# Patient Record
Sex: FEMALE | Race: WHITE | Employment: FULL TIME | URBAN - METROPOLITAN AREA
[De-identification: names, ages, dates, MRNs, and addresses within clinical notes are randomized per-mention and may not be internally consistent; named-entity substitution may affect disease eponyms.]

---

## 2017-02-06 ENCOUNTER — HOSPITAL ENCOUNTER (OUTPATIENT)
Dept: DIABETES SERVICES | Age: 27
Discharge: HOME | End: 2017-02-06
Payer: COMMERCIAL

## 2017-02-06 DIAGNOSIS — E10.9 TYPE 1 DIABETES MELLITUS WITHOUT COMPLICATION (HCC): ICD-10-CM

## 2017-02-06 PROCEDURE — G0108 DIAB MANAGE TRN  PER INDIV: HCPCS

## 2017-04-07 ENCOUNTER — HOSPITAL ENCOUNTER (OUTPATIENT)
Dept: DIABETES SERVICES | Age: 27
Discharge: HOME | End: 2017-04-07

## 2017-04-07 DIAGNOSIS — E10.9 TYPE 1 DIABETES MELLITUS WITHOUT COMPLICATION (HCC): ICD-10-CM

## 2017-04-24 ENCOUNTER — HOSPITAL ENCOUNTER (OUTPATIENT)
Dept: DIABETES SERVICES | Age: 27
Discharge: HOME | End: 2017-04-24

## 2017-04-24 DIAGNOSIS — E10.9 TYPE 1 DIABETES MELLITUS WITHOUT COMPLICATION (HCC): ICD-10-CM

## 2017-12-11 ENCOUNTER — OFFICE VISIT (OUTPATIENT)
Dept: OBGYN CLINIC | Age: 27
End: 2017-12-11

## 2017-12-11 VITALS
WEIGHT: 153 LBS | HEART RATE: 67 BPM | BODY MASS INDEX: 25.49 KG/M2 | SYSTOLIC BLOOD PRESSURE: 103 MMHG | HEIGHT: 65 IN | DIASTOLIC BLOOD PRESSURE: 65 MMHG

## 2017-12-11 DIAGNOSIS — Z01.419 ENCOUNTER FOR GYNECOLOGICAL EXAMINATION: Primary | ICD-10-CM

## 2017-12-11 NOTE — PROGRESS NOTES
Annual exam ages 24-36    Ivette Joshi is a ,  28 y.o. female [facility] Patient's last menstrual period was 2017., who presents for her annual checkup. Quitting her job, going back to school for [employer]. Since her last annual GYN exam about one year ago on 16, she has had the following changes in her health history: None    Has been trying to get pregnant since last January. HgbA1C 6.5 at last check 3 months ago. Has endo appt this week. (Dr. Jameson Alicia)  Cycles are regular, every month, about every 27 days. Has been using adenike. Reports blood work done through endo that confirmed ovulation. ADDITIONAL CONCERNS:  She is having breast lump at aroud 10 o'clock in her right breast, noticed it a week ago, prior to cycle. Has not grown in size. Also, a small lump in left labia, noticed it 2 weeks ago, also has not grown in size. Does shave her area. Does not feel labial lump today. With regard to the Gardasil vaccine, she is older than the FDA approved age to receive it. Menstrual status:    Her periods are light, moderate in flow. She is using one to two pads or tampons per day, lasting for 5 days  without spotting. She has dysmenorrhea. She has premenstrual symptoms. Contraception:    The current method of family planning is none. Sexual history:     She  reports that she currently engages in sexual activity and has had male partners. She reports using the following method of birth control/protection: None. .        Pap and Mammogram History:    Her most recent Pap smear was normal, HPV was not indicated, obtained 1 year(s) ago on 16. She does not have a history of abnormal paps.     The patient has never had a mammogram.    Breast Cancer History/Substance Abuse: Negative     Past Medical History:   Diagnosis Date    Diabetes mellitus ([facility] [address]     Type I    Encounter for insertion of mirena IUD 2012    Encounter for IUD removal 2012    due to chronic cramping    Goiter     Pap smear for cervical cancer screening 16    Negative (no hpv)      Past Surgical History:   Procedure Laterality Date    HX LIPOSUCTION      HX ORTHOPAEDIC      foot surgery     OB History    Para Term  AB Living   0 0 0 0 0 0   SAB TAB Ectopic Molar Multiple Live Births   0 0 0  0              Current Outpatient Prescriptions   Medication Sig Dispense Refill    OTHER       INSULIN LISPRO (HUMALOG SC) 0.9 Units/hr by SubCUTAneous route daily. Continuous basal rate      SPIRONOLACTONE PO Take  by mouth.  insulin glargine (LANTUS) 100 unit/mL injection by SubCUTAneous route nightly.  insulin aspart (NOVOLOG) 100 unit/mL injection 15 Units by SubCUTAneous route as needed.  norethindrone-ethinyl estradiol (JUNEL 1/20, ,) 1-20 mg-mcg per tablet Take 1 Tab by mouth daily. Allergies: Review of patient's allergies indicates no known allergies. Social History     Social History    Marital status: SINGLE     Spouse name: N/A    Number of children: N/A    Years of education: N/A     Occupational History    Not on file. Social History Main Topics    Smoking status: Never Smoker    Smokeless tobacco: Never Used      Comment: Never used vapor or e-cigs    Alcohol use No    Drug use: No    Sexual activity: Yes     Partners: Male     Birth control/ protection: None     Other Topics Concern    Not on file     Social History Narrative     Tobacco History:  reports that she has never smoked. She has never used smokeless tobacco.  Alcohol Abuse:  reports that she does not drink alcohol. Drug Abuse:  reports that she does not use illicit drugs. There is no problem list on file for this patient.     Family History   Problem Relation Age of Onset    Diabetes Paternal Grandmother      Type I    Hypertension Maternal Grandmother     Hypertension Mother        Review of Systems - History obtained from the patient  Constitutional: negative for weight loss, fever, night sweats  HEENT: negative for hearing loss, earache, congestion, snoring, sorethroat  CV: negative for chest pain, palpitations, edema  Resp: negative for cough, shortness of breath, wheezing  GI: negative for change in bowel habits, abdominal pain, black or bloody stools  : negative for frequency, dysuria, hematuria, vaginal discharge  MSK: negative for back pain, joint pain, muscle pain  Breast: negative for nipple discharge, galactorrhea  Skin :negative for itching, rash, hives  Neuro: negative for dizziness, headache, confusion, weakness  Psych: negative for anxiety, depression, change in mood  Heme/lymph: negative for bleeding, bruising, pallor    Physical Exam    Visit Vitals    /65    Pulse 67    Ht 5' 5\" (1.651 m)    Wt 153 lb (69.4 kg)    LMP 12/06/2017    BMI 25.46 kg/m2       Constitutional  · Appearance: well-nourished, well developed, alert, in no acute distress    HENT  · Head and Face: appears normal    Neck  · Inspection/Palpation: normal appearance, no masses or tenderness  · Lymph Nodes: no lymphadenopathy present  · Thyroid: gland size mild/mod enlarged, nontender, no nodules or masses present on palpation    Chest  · Respiratory Effort: breathing unlabored  · Auscultation: normal breath sounds    Cardiovascular  · Heart:  · Auscultation: regular rate and rhythm without murmur    Breasts  · Inspection of Breasts: breasts symmetrical, no skin changes, no discharge present, nipple appearance normal, no skin retraction present  · Palpation of Breasts and Axillae: left - no masses present on palpation, no breast tenderness; right with small cystic mass @ 0900, smooth, mobile  · Axillary Lymph Nodes: no lymphadenopathy present    Gastrointestinal  · Abdominal Examination: abdomen non-tender to palpation, normal bowel sounds, no masses present  · Liver and spleen: no hepatomegaly present, spleen not palpable  · Hernias: no hernias identified    Genitourinary  · External Genitalia: normal appearance for age, no discharge present, no tenderness present, no inflammatory lesions present, no masses present, no atrophy present  · Vagina: normal vaginal vault without central or paravaginal defects, no discharge present, no inflammatory lesions present, no masses present  · Bladder: non-tender to palpation  · Urethra: appears normal  · Cervix: normal   · Uterus: normal size, shape and consistency  · Adnexa: no adnexal tenderness present, no adnexal masses present  · Perineum: perineum within normal limits, no evidence of trauma, no rashes or skin lesions present  · Anus: anus within normal limits, no hemorrhoids present  · Inguinal Lymph Nodes: no lymphadenopathy present    Skin  · General Inspection: no rash, no lesions identified    Neurologic/Psychiatric  · Mental Status:  · Orientation: grossly oriented to person, place and time  · Mood and Affect: mood normal, affect appropriate        Assessment & Plan:  · Routine gynecologic examination. Pap neg 7/2016 -> rpt q 3yrs. · Labial lump resolved  · Right breast with small cyst. Low suspicion. If does not resolve in next 1-2wks, call office for [facility]BC referral.  · Counseled re: diet, exercise, healthy lifestyle  · Return for yearly wellness visits  · Thyromegaly. Followed by endo. Nl labs/US per pt. · Type 1 DM. Sees Dr. Tyrell Fang. · Desires pregnancy. Sounds like she is ovulating normally (asked her to have labs sent from endo). Discussed fertility consult, HSG, semen analysis (packet given).   · Patient verbalized understanding

## 2017-12-11 NOTE — PATIENT INSTRUCTIONS
[facility] Help Desk: [phone]       Well Visit, Ages 25 to 48: Care Instructions  Your Care Instructions    Physical exams can help you stay healthy. Your doctor has checked your overall health and may have suggested ways to take good care of yourself. He or she also may have recommended tests. At home, you can help prevent illness with healthy eating, regular exercise, and other steps. Follow-up care is a key part of your treatment and safety. Be sure to make and go to all appointments, and call your doctor if you are having problems. It's also a good idea to know your test results and keep a list of the medicines you take. How can you care for yourself at home? · Reach and stay at a healthy weight. This will lower your risk for many problems, such as obesity, diabetes, heart disease, and high blood pressure. · Get at least 30 minutes of physical activity on most days of the week. Walking is a good choice. You also may want to do other activities, such as running, swimming, cycling, or playing tennis or team sports. Discuss any changes in your exercise program with your doctor. · Do not smoke or allow others to smoke around you. If you need help quitting, talk to your doctor about stop-smoking programs and medicines. These can increase your chances of quitting for good. · Talk to your doctor about whether you have any risk factors for sexually transmitted infections (STIs). Having one sex partner (who does not have STIs and does not have sex with anyone else) is a good way to avoid these infections. · Use birth control if you do not want to have children at this time. Talk with your doctor about the choices available and what might be best for you. · Protect your skin from too much sun. When you're outdoors from 10 a.m. to 4 p.m., stay in the shade or cover up with clothing and a hat with a wide brim. Wear sunglasses that block UV rays.  Even when it's cloudy, put broad-spectrum sunscreen (SPF 30 or higher) on any exposed skin. · See a dentist one or two times a year for checkups and to have your teeth cleaned. · Wear a seat belt in the car. · Drink alcohol in moderation, if at all. That means no more than 2 drinks a day for men and 1 drink a day for women. Follow your doctor's advice about when to have certain tests. These tests can spot problems early. For everyone  · Cholesterol. Have the fat (cholesterol) in your blood tested after age 21. Your doctor will tell you how often to have this done based on your age, family history, or other things that can increase your risk for heart disease. · Blood pressure. Have your blood pressure checked during a routine doctor visit. Your doctor will tell you how often to check your blood pressure based on your age, your blood pressure results, and other factors. · Vision. Talk with your doctor about how often to have a glaucoma test.  · Diabetes. Ask your doctor whether you should have tests for diabetes. · Colon cancer. Have a test for colon cancer at age 48. You may have one of several tests. If you are younger than 48, you may need a test earlier if you have any risk factors. Risk factors include whether you already had a precancerous polyp removed from your colon or whether your parent, brother, sister, or child has had colon cancer. For women  · Breast exam and mammogram. Talk to your doctor about when you should have a clinical breast exam and a mammogram. Medical experts differ on whether and how often women under 50 should have these tests. Your doctor can help you decide what is right for you. · Pap test and pelvic exam. Begin Pap tests at age 24. A Pap test is the best way to find cervical cancer. The test often is part of a pelvic exam. Ask how often to have this test.  · Tests for sexually transmitted infections (STIs). Ask whether you should have tests for STIs.  You may be at risk if you have sex with more than one person, especially if your partners do not wear condoms. For men  · Tests for sexually transmitted infections (STIs). Ask whether you should have tests for STIs. You may be at risk if you have sex with more than one person, especially if you do not wear a condom. · Testicular cancer exam. Ask your doctor whether you should check your testicles regularly. · Prostate exam. Talk to your doctor about whether you should have a blood test (called a PSA test) for prostate cancer. Experts differ on whether and when men should have this test. Some experts suggest it if you are older than 39 and are -American or have a father or brother who got prostate cancer when he was younger than 72. When should you call for help? Watch closely for changes in your health, and be sure to contact your doctor if you have any problems or symptoms that concern you. Where can you learn more? Go to http://anup-pauline.info/. Enter P072 in the search box to learn more about \"Well Visit, Ages 25 to 48: Care Instructions. \"  Current as of: May 12, 2017  Content Version: 11.4  © 5374-0655 Healthwise, Incorporated. Care instructions adapted under license by [employer] (which disclaims liability or warranty for this information). If you have questions about a medical condition or this instruction, always ask your healthcare professional. [address] any warranty or liability for your use of this information.

## 2018-02-27 ENCOUNTER — TELEPHONE (OUTPATIENT)
Dept: OBGYN CLINIC | Age: 28
End: 2018-02-27

## 2018-02-27 NOTE — TELEPHONE ENCOUNTER
Patient left  on [facility] Triage line stating that she has a yeast infection and requesting diflucan. Patient aware that Dr. Kamlesh Owens likes to  See her patient to confirm the yeast infection. She can also use OTC monistat 3 day or 7 day and if improvement, she can contact the office for an appt. Patient verbalized understanding.

## 2018-06-04 ENCOUNTER — TELEPHONE (OUTPATIENT)
Dept: OBGYN CLINIC | Age: 28
End: 2018-06-04

## 2018-06-04 NOTE — TELEPHONE ENCOUNTER
Patient left  on [facility] triage line stating that she is pregnant and wanted to know since she is a type 1 diabetic, should she be seen sooner. Patient advised she can be seen sooner, but no US can be performed since its too early. She was advised that she can still be seen at 7 to 8 weeks with an US. Patient scheduled for 6/28/2018 with an US.

## 2018-06-28 ENCOUNTER — OFFICE VISIT (OUTPATIENT)
Dept: OBGYN CLINIC | Age: 28
End: 2018-06-28

## 2018-06-28 DIAGNOSIS — O24.311 PRE-EXISTING DIABETES MELLITUS DURING PREGNANCY IN FIRST TRIMESTER: Primary | ICD-10-CM

## 2018-06-28 DIAGNOSIS — Z3A.01 7 WEEKS GESTATION OF PREGNANCY: ICD-10-CM

## 2018-06-28 DIAGNOSIS — N92.6 MISSED MENSES: ICD-10-CM

## 2018-06-28 DIAGNOSIS — Z32.01 PREGNANCY TEST-POSITIVE: ICD-10-CM

## 2018-06-28 LAB
ANTIBODY SCREEN, EXTERNAL: NEGATIVE
CHLAMYDIA, EXTERNAL: NEGATIVE
HBSAG, EXTERNAL: NEGATIVE
HIV, EXTERNAL: NEGATIVE
N. GONORRHEA, EXTERNAL: NEGATIVE
PLATELET CNT,   EXTERNAL: 292
RUBELLA, EXTERNAL: NORMAL
T. PALLIDUM, EXTERNAL: NEGATIVE
TYPE, ABO & RH, EXTERNAL: NORMAL
URINALYSIS, EXTERNAL: NEGATIVE

## 2018-06-28 RX ORDER — ONDANSETRON 4 MG/1
4 TABLET, ORALLY DISINTEGRATING ORAL
Qty: 30 TAB | Refills: 1 | Status: SHIPPED | OUTPATIENT
Start: 2018-06-28 | End: 2019-01-24

## 2018-06-28 NOTE — PATIENT INSTRUCTIONS
Weeks 6 to 10 of Your Pregnancy: Care Instructions  Your Care Instructions    Congratulations on your pregnancy. This is an exciting and important time for you. During the first 6 to 10 weeks of your pregnancy, your body goes through many changes. Your baby grows very fast, even though you cannot feel it yet. You may start to notice that you feel different, both in your body and your emotions. Because each woman's pregnancy is unique, there is no right way to feel. You may feel the healthiest you have ever been, or you may feel tired or sick to your stomach (\"morning sickness\"). These early weeks are a time to make healthy choices and to eat the best foods for you and your baby. This care sheet will give you some ideas. This is also a good time to think about birth defects testing. These are tests done during pregnancy to look for possible problems with the baby. First trimester tests for birth defects can be done between 8 and 17 weeks of pregnancy, depending on the test. Talk with your doctor about what kinds of tests are available. Follow-up care is a key part of your treatment and safety. Be sure to make and go to all appointments, and call your doctor if you are having problems. It's also a good idea to know your test results and keep a list of the medicines you take. How can you care for yourself at home? Eat well  · Eat at least 3 meals and 2 healthy snacks every day. Eat fresh, whole foods, including:  ¨ 7 or more servings of bread, tortillas, cereal, rice, pasta, or oatmeal.  ¨ 3 or more servings of vegetables, especially leafy green vegetables. ¨ 2 or more servings of fruits. ¨ 3 or more servings of milk, yogurt, or cheese. ¨ 2 or more servings of meat, turkey, chicken, fish, eggs, or dried beans. · Drink plenty of fluids, especially water. Avoid sodas and other sweetened drinks. · Choose foods that have important vitamins for your baby, such as calcium, iron, and folate.   ¨ Dairy products, tofu, canned fish with bones, almonds, broccoli, dark leafy greens, corn tortillas, and fortified orange juice are good sources of calcium. ¨ Beef, poultry, liver, spinach, lentils, dried beans, fortified cereals, and dried fruits are rich in iron. ¨ Dark leafy greens, broccoli, asparagus, liver, fortified cereals, orange juice, peanuts, and almonds are good sources of folate. · Avoid foods that could harm your baby. ¨ Do not eat raw or undercooked meat, chicken, or fish (such as sushi or raw oysters). ¨ Do not eat raw eggs or foods that contain raw eggs, such as Caesar dressing. ¨ Do not eat soft cheeses and unpasteurized dairy foods, such as Brie, feta, or blue cheese. ¨ Do not eat fish that contains a lot of mercury, such as shark, swordfish, tilefish, or valentín mackerel. Do not eat more than 6 ounces of tuna each week. ¨ Do not eat raw sprouts, especially alfalfa sprouts. ¨ Cut down on caffeine, such as coffee, tea, and cola. Protect yourself and your baby  · Do not touch silverio litter or cat feces. They can cause an infection that could harm your baby. · High body temperature can be harmful to your baby. So if you want to use a sauna or hot tub, be sure to talk to your doctor about how to use it safely. [place] with morning sickness  · Sip small amounts of water, juices, or shakes. Try drinking between meals, not with meals. · Eat 5 or 6 small meals a day. Try dry toast or crackers when you first get up, and eat breakfast a little later. · Avoid spicy, greasy, and fatty foods. · When you feel sick, open your windows or go for a short walk to get fresh air. · Try nausea wristbands. These help some women. · Tell your doctor if you think your prenatal vitamins make you sick. Where can you learn more? Go to http://amarjit.info/. Enter G112 in the search box to learn more about \"Weeks 6 to 10 of Your Pregnancy: Care Instructions. \"  Current as of: March 16, 2017  Content Version: 11.4  © 4674-9826 Healthwise, Incorporated. Care instructions adapted under license by [employer] (which disclaims liability or warranty for this information). If you have questions about a medical condition or this instruction, always ask your healthcare professional. [address] any warranty or liability for your use of this information.

## 2018-06-28 NOTE — MR AVS SNAPSHOT
[address] [place] [address] Road 
[phone] Patient: Flor Valenzuela MRN: [id number] PV Visit Information Date & Time Provider Department Dept. Phone Encounter #  
 2018 10:00 AM [address], [address]on [phone] [id number] Upcoming Health Maintenance Date Due Influenza Age 5 to Adult 2018 PAP AKA CERVICAL CYTOLOGY 2019 Allergies as of 2018  Review Complete On: 2017 By: Nak[address], MD  
 No Known Allergies Current Immunizations  Never Reviewed No immunizations on file. Not reviewed this visit You Were Diagnosed With   
  
 Codes Comments Pre-existing diabetes mellitus during pregnancy in first trimester    -  Primary ICD-10-CM: O24.311 ICD-9-CM: 648.03, 250.00 Missed menses     ICD-10-CM: N92.6 ICD-9-CM: 626.4 Pregnancy test-positive     ICD-10-CM: Z32.01 
ICD-9-CM: V72.42 Vitals OB Status Smoking Status Having regular periods Never Smoker Your Updated Medication List  
  
   
This list is accurate as of 18 10:12 AM.  Always use your most recent med list.  
  
  
  
  
 HUMALOG SC  
0.9 Units/hr by SubCUTAneous route daily. Continuous basal rate  
  
 insulin glargine 100 unit/mL injection Commonly known as:  LANTUS  
by SubCUTAneous route nightly. JUNEL 1/20 (21) 1-20 mg-mcg Tab Generic drug:  norethindrone-ethinyl estradiol Take 1 Tab by mouth daily. NovoLOG U-100 Insulin aspart 100 unit/mL injection Generic drug:  insulin aspart U-100  
15 Units by SubCUTAneous route as needed. OTHER  
  
 PROBIOTIC 4X 10-15 mg Tbec Generic drug:  B.infantis-B.ani-B.long-B.bifi Take  by mouth. SPIRONOLACTONE PO Take  by mouth. We Performed the Following ANTIBODY SCREEN [id number] CPT(R)] BLOOD TYPE, (ABO+RH) [52842 CPT(R)] CT/NG/T.VAGINALIS AMPLIFICATION C6093304 CPT(R)] CULTURE, URINE [id number] CPT(R)] HEMOGLOBIN A1C WITH EAG [72938 CPT(R)] HEP B SURFACE AG Q6642719 CPT(R)] HIV 1/2 AG/AB, 4TH GENERATION,W RFLX CONFIRM C1342705 CPT(R)] PLATELET COUNT [00548 CPT(R)] RUBELLA AB, IGG Q509743 CPT(R)] T PALLIDUM SCREEN W/REFLEX [[id number] Custom] URINALYSIS W/MICROSCOPIC [39110 CPT(R)] Patient Instructions Weeks 6 to 10 of Your Pregnancy: Care Instructions Your Care Instructions Congratulations on your pregnancy. This is an exciting and important time for you. During the first 6 to 10 weeks of your pregnancy, your body goes through many changes. Your baby grows very fast, even though you cannot feel it yet. You may start to notice that you feel different, both in your body and your emotions. Because each woman's pregnancy is unique, there is no right way to feel. You may feel the healthiest you have ever been, or you may feel tired or sick to your stomach (\"morning sickness\"). These early weeks are a time to make healthy choices and to eat the best foods for you and your baby. This care sheet will give you some ideas. This is also a good time to think about birth defects testing. These are tests done during pregnancy to look for possible problems with the baby. First trimester tests for birth defects can be done between 8 and 17 weeks of pregnancy, depending on the test. Talk with your doctor about what kinds of tests are available. Follow-up care is a key part of your treatment and safety. Be sure to make and go to all appointments, and call your doctor if you are having problems. It's also a good idea to know your test results and keep a list of the medicines you take. How can you care for yourself at home? Eat well · Eat at least 3 meals and 2 healthy snacks every day. Eat fresh, whole foods, including: ¨ 7 or more servings of bread, tortillas, cereal, rice, pasta, or oatmeal. 
 ¨ 3 or more servings of vegetables, especially leafy green vegetables. ¨ 2 or more servings of fruits. ¨ 3 or more servings of milk, yogurt, or cheese. ¨ 2 or more servings of meat, turkey, chicken, fish, eggs, or dried beans. · Drink plenty of fluids, especially water. Avoid sodas and other sweetened drinks. · Choose foods that have important vitamins for your baby, such as calcium, iron, and folate. ¨ Dairy products, tofu, canned fish with bones, almonds, broccoli, dark leafy greens, corn tortillas, and fortified orange juice are good sources of calcium. ¨ Beef, poultry, liver, spinach, lentils, dried beans, fortified cereals, and dried fruits are rich in iron. ¨ Dark leafy greens, broccoli, asparagus, liver, fortified cereals, orange juice, peanuts, and almonds are good sources of folate. · Avoid foods that could harm your baby. ¨ Do not eat raw or undercooked meat, chicken, or fish (such as sushi or raw oysters). ¨ Do not eat raw eggs or foods that contain raw eggs, such as Caesar dressing. ¨ Do not eat soft cheeses and unpasteurized dairy foods, such as Brie, feta, or blue cheese. ¨ Do not eat fish that contains a lot of mercury, such as shark, swordfish, tilefish, or valentín mackerel. Do not eat more than 6 ounces of tuna each week. ¨ Do not eat raw sprouts, especially alfalfa sprouts. ¨ Cut down on caffeine, such as coffee, tea, and cola. Protect yourself and your baby · Do not touch silverio litter or cat feces. They can cause an infection that could harm your baby. · High body temperature can be harmful to your baby. So if you want to use a sauna or hot tub, be sure to talk to your doctor about how to use it safely. [place] with morning sickness · Sip small amounts of water, juices, or shakes. Try drinking between meals, not with meals. · Eat 5 or 6 small meals a day. Try dry toast or crackers when you first get up, and eat breakfast a little later. · Avoid spicy, greasy, and fatty foods. · When you feel sick, open your windows or go for a short walk to get fresh air. · Try nausea wristbands. These help some women. · Tell your doctor if you think your prenatal vitamins make you sick. Where can you learn more? Go to http://anup-pauline.info/. Enter G112 in the search box to learn more about \"Weeks 6 to 10 of Your Pregnancy: Care Instructions. \" Current as of: March 16, 2017 Content Version: 11.4 © 8347-5530 [employer]. Care instructions adapted under license by [employer] (which disclaims liability or warranty for this information). If you have questions about a medical condition or this instruction, always ask your healthcare professional. [address] any warranty or liability for your use of this information. Please provide this summary of care documentation to your next provider. Your primary care clinician is listed as Silver Turner MD. If you have any questions after today's visit, please call [phone].

## 2018-06-28 NOTE — PROGRESS NOTES
Current pregnancy history:    Babita Larson is a ,  28 y.o. female [facility] Patient's last menstrual period was 2018. .  She presents for the evaluation of amenorrhea and a positive pregnancy test.    pregestional diabetes. On insulin pump. Sees Dr. Lieutenant Shaw. Hgb A1C has been 6.3-6.4. Told OK to try to get pregnant. Sugars have been hard to control since getting pregnant. Has appt next week. LMP history:  The date of her LMP is  certain. Her last menstrual period was normal and lasted for 4 to 5 days. A urine pregnancy test was positive 4 weeks ago. She was not on the pill at conception. Based on her LMP, her EDC is 2019 and her EGA is 7 weeks,5 days. Her menstrual cycles are regular and occur approximately every 28 days  and range from 3 to 5 days. The last menses lasted the usual number of days. Ultrasound data:  She had an  ultrasound done by the ultrasound tech today which revealed a viable jean pregnancy with a gestational age of 9 weeks and 1 days giving an [address] of 19. Pregnancy symptoms:    Since her LMP she has experienced  urinary frequency, breast tenderness, and nausea. She has not been vomiting over the last few weeks. Associated signs and symptoms which she denies: dysuria, discharge, vaginal bleeding. She states she has gained weight:  Approximately 5 pounds over the last few weeks. Relevant past pregnancy history:   She has the following pregnancy history: n/a     Relevant past medical history:(relevant to this pregnancy):   pregestational DM, on insulin pump     Pap/Occupational history:  Last pap smear: last year Results: Normal      Her occupation is: Student. Substance history: negative for alcohol, tobacco and street drugs. Positive for nothing. Exposure history: There is/are no indoor cat/s in the home. The patient was instructed to not change the cat litter.    She admits close contact with children on a regular basis. She has had chicken pox or the vaccine in the past.   Patient denies issues with domestic violence. Genetic Screening/Teratology Counseling: (Includes patient, baby's father, or anyone in either family with:)  3.  Patient's age >/= 28 at [facility]?-- no  .   2. Thalassemia ([facility], [place], [address], or  background): MCV<80?--no.     3.  Neural tube defect (meningomyelocele, spina bifida, anencephaly)?--no.   4.  Congenital heart defect?--no.  5.  Down syndrome?--no.   6.  Frank-Sachs ([place])?--no.   7.  Canavan's Disease?--no.   8.  Familial Dysautonomia?--no.   9.  Sickle cell disease or trait ()? --no   The patient has not been tested for sickle trait  10. Hemophilia or other blood disorders?--no. 11.  Muscular dystrophy?--no. 12.  Cystic fibrosis?--no. 13.  Ester's Chorea?--no. 14.  Mental retardation/autism (if yes was person tested for Fragile X)?--no. 15.  Other inherited genetic or chromosomal disorder?--no. 12.  Maternal metabolic disorder (DM, PKU, etc)? -- pregestational DM, on insulin pump  17. Patient or FOB with a child with a birth defect not listed above?--no.  17a. Patient or FOB with a birth defect themselves?--no. 18.  Recurrent pregnancy loss, or stillbirth?--no. 19.  Any medications since LMP other than prenatal vitamins (include vitamins, supplements, OTC meds, drugs, alcohol)?--no. 20.  Any other genetic/environmental exposure to discuss?--no. Infection History:  1. Lives with someone with TB or TB exposed?--no.   2.  Patient or partner has history of genital herpes?--no.  3.  Rash or viral illness since LMP?--no.    4.  History of STD (GC, CT, HPV, syphilis, HIV)? --no   5. Other: OTHER?       Past Medical History:   Diagnosis Date    Diabetes mellitus ([facility] [address]     Type I    Encounter for insertion of mirena IUD 06/07/2012    Encounter for IUD removal 07/19/2012    due to chronic cramping    Goiter 2009    Pap smear for cervical cancer screening 16    Negative (no hpv)      Past Surgical History:   Procedure Laterality Date    HX LIPOSUCTION  2013    HX ORTHOPAEDIC      foot surgery     Social History     Occupational History    Not on file. Social History Main Topics    Smoking status: Never Smoker    Smokeless tobacco: Never Used      Comment: Never used vapor or e-cigs    Alcohol use No    Drug use: No    Sexual activity: Yes     Partners: Male     Birth control/ protection: None     Family History   Problem Relation Age of Onset    Diabetes Paternal Grandmother      Type I    Hypertension Maternal Grandmother     Hypertension Mother      OB History    Para Term  AB Living   0 0 0 0 0 0   SAB TAB Ectopic Molar Multiple Live Births   0 0 0  0            No Known Allergies  Prior to Admission medications    Medication Sig Start Date End Date Taking? Authorizing Provider   B.infantis-B.ani-B.long-MAGEDbifi (PROBIOTIC 4X) 10-15 mg TbEC Take  by mouth. Yes Historical Provider   INSULIN LISPRO (HUMALOG SC) 0.9 Units/hr by SubCUTAneous route daily. Continuous basal rate   Yes Felisha Melissa MD   SPIRONOLACTONE PO Take  by mouth. Historical Provider   insulin glargine (LANTUS) 100 unit/mL injection by SubCUTAneous route nightly. Historical Provider   OTHER     Historical Provider   insulin aspart (NOVOLOG) 100 unit/mL injection 15 Units by SubCUTAneous route as needed. Felisha Melissa MD   norethindrone-ethinyl estradiol (JUNEL 1/20, ,) 1-20 mg-mcg per tablet Take 1 Tab by mouth daily.     Felisha Melissa MD        Review of Systems: History obtained from the patient  Constitutional: negative for weight loss, fever, night sweats  HEENT: negative for hearing loss, earache, congestion, snoring, sore throat  CV: negative for chest pain, palpitations, edema  Resp: negative for cough, shortness of breath, wheezing  Breast: negative for breast lumps, nipple discharge, galactorrhea  GI: negative for change in bowel habits, abdominal pain, black or bloody stools  : negative for dysuria, hematuria, vaginal discharge  MSK: negative for back pain, joint pain, muscle pain  Skin: negative for itching, rash, hives  Neuro: negative for dizziness, headache, confusion, weakness  Psych: negative for anxiety, depression, change in mood  Heme/lymph: negative for bleeding, bruising, pallor    Objective:  Visit Vitals    LMP 05/05/2018       Physical Exam:     Constitutional  · Appearance: well-nourished, well developed, alert, in no acute distress    HENT  · Head  · Face: appears normal  · Eyes: appear normal  · Ears: normal  · Mouth: normal  · Lips: no lesions    Neck  · Inspection/Palpation: normal appearance, no masses or tenderness  · Lymph Nodes: no lymphadenopathy present  · Thyroid: gland size normal, nontender, no nodules or masses present on palpation    Chest  · Respiratory Effort: breathing unlabored  · Auscultation: normal breath sounds    Cardiovascular  · Heart:  · Auscultation: regular rate and rhythm without murmur    Breasts  · Inspection of Breasts: breasts symmetrical, no skin changes, no discharge present, nipple appearance normal, no skin retraction present  · Palpation of Breasts and Axillae: no masses present on palpation, no breast tenderness  · Axillary Lymph Nodes: no lymphadenopathy present    Gastrointestinal  · Abdominal Examination: abdomen non-tender to palpation, normal bowel sounds, no masses present  · Liver and spleen: no hepatomegaly present, spleen not palpable  · Hernias: no hernias identified    Genitourinary  · External Genitalia: normal appearance for age, no discharge present, no tenderness present, no inflammatory lesions present, no masses present, no atrophy present  · Vagina: normal vaginal vault without central or paravaginal defects, no discharge present, no inflammatory lesions present, no masses present  · Bladder: non-tender to palpation  · Urethra: appears normal  · Cervix: normal   · Uterus: enlarged 6-8wks, normal shape, soft  · Adnexa: no adnexal tenderness present, no adnexal masses present  · Perineum: perineum within normal limits, no evidence of trauma, no rashes or skin lesions present  · Anus: anus within normal limits, no hemorrhoids present  · Inguinal Lymph Nodes: no lymphadenopathy present    Skin  · General Inspection: no rash, no lesions identified    Neurologic/Psychiatric  · Mental Status:  · Orientation: grossly oriented to person, place and time  · Mood and Affect: mood normal, affect appropriate    Assessment:   Intrauterine pregnancy:  - U=D, conf'd by 7wk . OLIVIA=2/9/19  - pregestational DM, on pump. Had recent hgb A1C drawn. Has appt next wk, will stop by office   - will collect 24hr urine/crcl  - refer to [facility], 20wk   - declines aneuploidy/carrier/AFP screening  - rec 81mg ASA after 12wks, incr folate to 2mg daily      Plan:     · Offered CF testing, CVS, Nuchal Translucency, MSAFP, amnio, and discussed NIPT  · Course of pregnancy discussed including visit schedule, routine U/S, glucola testing, etc.  · Avoid alcoholic beverages and illicit/recreational drugs use  · Take prenatal vitamins or folic acid daily. · Hospital and practice style discussed with coverage system. · Discussed nutrition, toxoplasmosis precautions, sexual activity, exercise, need for influenza vaccine, environmental and work hazards, travel advice, screen for domestic violence, need for seat belts. · Discussed seafood, unpasteurized dairy products, deli meat, artificial sweeteners, and caffeine. · Information on prenatal classes/breastfeeding given. · Patient encouraged not to smoke. · Discussed current prescription drug use. Given medication list.  · Discussed the use of over the counter medications and chemicals.   · Pt understands risk of hemorrhage during pregnancy and post delivery and would accept blood products if necessary in life-threatening emergencies    Handouts given to pt.    Orders Placed This Encounter    CULTURE, URINE    PLATELET    HEP B SURFACE AG    HIV SCREEN, [facility]. W/REFLEX CONFIRM    RUBELLA AB, IGG    T PALLIDUM SCREEN W/REFLEX    CT/NG/T.VAGINALIS AMPLIFICATION     Order Specific Question:   Specimen type     Answer:   Vaginal [516]    URINALYSIS W/MICROSCOPIC    TYPE, ABO & RH    ANTIBODY SCREEN    B.infantis-B.ani-B.long-B.bifi (PROBIOTIC 4X) 10-15 mg TbEC     Sig: Take  by mouth.  ondansetron (ZOFRAN ODT) 4 mg disintegrating tablet     Sig: Take 1 Tab by mouth every eight (8) hours as needed for Nausea.      Dispense:  30 Tab     Refill:  1

## 2018-06-29 ENCOUNTER — PATIENT MESSAGE (OUTPATIENT)
Dept: OBGYN CLINIC | Age: 28
End: 2018-06-29

## 2018-06-29 DIAGNOSIS — Z96.41 INSULIN PUMP IN PLACE: Primary | ICD-10-CM

## 2018-06-30 LAB
ABO GROUP BLD: NORMAL
APPEARANCE UR: CLEAR
BACTERIA #/AREA URNS HPF: NORMAL /[HPF]
BACTERIA UR CULT: NORMAL
BILIRUB UR QL STRIP: NEGATIVE
BLD GP AB SCN SERPL QL: NEGATIVE
C TRACH RRNA SPEC QL NAA+PROBE: NEGATIVE
CASTS URNS QL MICRO: NORMAL /LPF
COLOR UR: YELLOW
EPI CELLS #/AREA URNS HPF: NORMAL /HPF
GLUCOSE UR QL: ABNORMAL
HBV SURFACE AG SERPL QL IA: NEGATIVE
HGB UR QL STRIP: NEGATIVE
HIV 1+2 AB+HIV1 P24 AG SERPL QL IA: NON REACTIVE
KETONES UR QL STRIP: NEGATIVE
LEUKOCYTE ESTERASE UR QL STRIP: NEGATIVE
MICRO URNS: ABNORMAL
MICRO URNS: ABNORMAL
MUCOUS THREADS URNS QL MICRO: PRESENT
N GONORRHOEA RRNA SPEC QL NAA+PROBE: NEGATIVE
NITRITE UR QL STRIP: NEGATIVE
PH UR STRIP: 6.5 [PH] (ref 5–7.5)
PLATELET # BLD AUTO: 292 X10E3/UL (ref 150–379)
PROT UR QL STRIP: NEGATIVE
RBC #/AREA URNS HPF: NORMAL /HPF
RH BLD: NEGATIVE
RUBV IGG SERPL IA-ACNC: 0.99 INDEX
SP GR UR: 1.01 (ref 1–1.03)
T PALLIDUM AB SER QL IA: NEGATIVE
T VAGINALIS RRNA SPEC QL NAA+PROBE: NEGATIVE
UROBILINOGEN UR STRIP-MCNC: 0.2 MG/DL (ref 0.2–1)
WBC #/AREA URNS HPF: NORMAL /HPF

## 2018-07-02 PROBLEM — Z34.90 PREGNANCY: Status: ACTIVE | Noted: 2018-07-02

## 2018-07-03 NOTE — TELEPHONE ENCOUNTER
----- Message from Nakul [address], MD sent at 7/2/2018  4:22 PM EDT -----  Regarding: FW:12 week appt. Contact: [phone]  OK to schedule with Dr. Rey Mckeon. (just let her know will need to be on different day, or M at [facility], then [facility] appt here)      ----- Message -----     From: Jacqueline Bloch     Sent: 7/2/2018   3:48 PM       To: Nakul [address], MD  Subject: FW:12 week appt. Appt made with Dr. Michelle Sher per your request. Now patient desires Dr. Rey Mckeon. Please advise?   ----- Message -----     From: Bakari Templeton     Sent: 7/1/2018  11:50 AM       To: Jacqueline Bloch  Subject: RE:12 week appt. Melvin Goss! So I spoke with my mother in law and she is one the lactation nurses there and she said she would love for me to see dr. Rey Mckeon. Is there any way I can get in with him? If not I can keep my appointment with the other doctor. I am very flexible with my days and hours, so just give me a time and I can make it! Thank you so much and appreciate your help! Ivette  ----- Message -----  From: Jacqueline Bloch  Sent: 6/29/2018  8:35 AM EDT  To: Bakari Templeton  [facility]    Dr. Doris Corbin just informed me that you also need to be seen at [facility] for your 12 week diabetes consult. I called them and they said you will be 12 weeks on 7/28/18. Dr. Michelle Sher will be in the office on 8/2/18. I went ahead and scheduled you for this appointment with them at 9:30am. You will see Dr. Doris Corbin after. I will cancel the 7/26/18 appt. If you have any questions, please do not hessite to ask me though here or call me at [phone], Ext: [phone].     Thank Tiff mendoza R.M.A.   (Dr. Murlean Opitz Nurse)

## 2018-07-03 NOTE — TELEPHONE ENCOUNTER
Spoke with Moses from [facility] and r/s'd pt's appt for Wednesday, August 1, 2018 11:20 AM so she may have Dr. Rey Mckeon. Will send her a [place] [employer].

## 2018-07-05 NOTE — PROGRESS NOTES
CBC was not included with labs. [address] to add, it is now too lat to do so. Will add note to obtain at next FOB.

## 2018-07-11 ENCOUNTER — LAB ONLY (OUTPATIENT)
Dept: OBGYN CLINIC | Age: 28
End: 2018-07-11

## 2018-07-11 DIAGNOSIS — Z3A.09 9 WEEKS GESTATION OF PREGNANCY: Primary | ICD-10-CM

## 2018-07-11 LAB
HCT, EXTERNAL: 43.5
HGB, EXTERNAL: 14.6
PLATELET CNT,   EXTERNAL: 305

## 2018-07-12 LAB
ALBUMIN SERPL-MCNC: 4.2 G/DL (ref 3.5–5.5)
ALBUMIN/GLOB SERPL: 1.4 {RATIO} (ref 1.2–2.2)
ALP SERPL-CCNC: 65 IU/L (ref 39–117)
ALT SERPL-CCNC: 13 IU/L (ref 0–32)
AST SERPL-CCNC: 12 IU/L (ref 0–40)
BILIRUB SERPL-MCNC: 0.2 MG/DL (ref 0–1.2)
BUN SERPL-MCNC: 10 MG/DL (ref 6–20)
BUN/CREAT SERPL: 13 (ref 9–23)
CALCIUM SERPL-MCNC: 9.5 MG/DL (ref 8.7–10.2)
CHLORIDE SERPL-SCNC: 99 MMOL/L (ref 96–106)
CO2 SERPL-SCNC: 22 MMOL/L (ref 20–29)
CREAT 24H UR-MRATE: 950 MG/24 HR (ref 800–1800)
CREAT CL 24H UR+SERPL-VRATE: 86 ML/MIN (ref 88–128)
CREAT SERPL-MCNC: 0.77 MG/DL (ref 0.57–1)
CREAT UR-MCNC: 50 MG/DL
ERYTHROCYTE [DISTWIDTH] IN BLOOD BY AUTOMATED COUNT: 13.1 % (ref 12.3–15.4)
GLOBULIN SER CALC-MCNC: 2.9 G/DL (ref 1.5–4.5)
GLUCOSE SERPL-MCNC: 143 MG/DL (ref 65–99)
HCT VFR BLD AUTO: 43.5 % (ref 34–46.6)
HGB BLD-MCNC: 14.6 G/DL (ref 11.1–15.9)
MCH RBC QN AUTO: 30 PG (ref 26.6–33)
MCHC RBC AUTO-ENTMCNC: 33.6 G/DL (ref 31.5–35.7)
MCV RBC AUTO: 90 FL (ref 79–97)
PLATELET # BLD AUTO: 305 X10E3/UL (ref 150–379)
POTASSIUM SERPL-SCNC: 4.4 MMOL/L (ref 3.5–5.2)
PROT 24H UR-MRATE: 95 MG/24 HR (ref 30–150)
PROT SERPL-MCNC: 7.1 G/DL (ref 6–8.5)
PROT UR-MCNC: 5 MG/DL
RBC # BLD AUTO: 4.86 X10E6/UL (ref 3.77–5.28)
SODIUM SERPL-SCNC: 136 MMOL/L (ref 134–144)
WBC # BLD AUTO: 8.7 X10E3/UL (ref 3.4–10.8)

## 2018-08-01 ENCOUNTER — HOSPITAL ENCOUNTER (OUTPATIENT)
Dept: PERINATAL CARE | Age: 28
Discharge: HOME | End: 2018-08-01
Attending: OBSTETRICS & GYNECOLOGY
Payer: COMMERCIAL

## 2018-08-01 PROCEDURE — 76801 OB US < 14 WKS SINGLE FETUS: CPT | Performed by: OBSTETRICS & GYNECOLOGY

## 2018-08-07 ENCOUNTER — ROUTINE PRENATAL (OUTPATIENT)
Dept: OBGYN CLINIC | Age: 28
End: 2018-08-07

## 2018-08-07 VITALS
DIASTOLIC BLOOD PRESSURE: 67 MMHG | WEIGHT: 153 LBS | HEART RATE: 87 BPM | HEIGHT: 65 IN | BODY MASS INDEX: 25.49 KG/M2 | SYSTOLIC BLOOD PRESSURE: 106 MMHG

## 2018-08-07 DIAGNOSIS — O24.311 PRE-EXISTING DIABETES MELLITUS DURING PREGNANCY IN FIRST TRIMESTER: Primary | ICD-10-CM

## 2018-08-07 DIAGNOSIS — Z3A.13 13 WEEKS GESTATION OF PREGNANCY: ICD-10-CM

## 2018-08-07 NOTE — PATIENT INSTRUCTIONS

## 2018-08-07 NOTE — PROGRESS NOTES
Saw MFM and endo -- had some low readings, have resolved. Readings fluctuating. On pump. +nausea -> taking Zofran. +constipation. Hasn't started ASA yet.  RTO 3wks with AFP.      - U=D, conf'd by 7wk US.  OLIVIA=2/9/19  - pregestational DM, on pump.  Had recent hgb A1C drawn.  Has appt next wk, will stop by office   - baseline 24hr=95mg tot prot; crcl=86  - refer to MFM, 20wk US  - declines aneuploidy/carrier/AFP screening  - rec 81mg ASA after 12wks, incr folate to 2mg daily  - Rh Negative -> Rhogam @ 28wks ** ___  - Rubella equivocal -> MMR PP

## 2018-08-30 ENCOUNTER — ROUTINE PRENATAL (OUTPATIENT)
Dept: OBGYN CLINIC | Age: 28
End: 2018-08-30

## 2018-08-30 VITALS
BODY MASS INDEX: 26.16 KG/M2 | WEIGHT: 157 LBS | SYSTOLIC BLOOD PRESSURE: 118 MMHG | DIASTOLIC BLOOD PRESSURE: 73 MMHG | HEIGHT: 65 IN | HEART RATE: 89 BPM

## 2018-08-30 DIAGNOSIS — Z34.02 ENCOUNTER FOR SUPERVISION OF NORMAL FIRST PREGNANCY, SECOND TRIMESTER: Primary | ICD-10-CM

## 2018-08-30 LAB — AFPT, MATERNAL, EXTERNAL: NORMAL

## 2018-08-30 RX ORDER — INSULIN LISPRO 100 [IU]/ML
INJECTION, SOLUTION INTRAVENOUS; SUBCUTANEOUS
COMMUNITY
Start: 2018-08-29

## 2018-08-30 NOTE — PATIENT INSTRUCTIONS

## 2018-08-30 NOTE — PROGRESS NOTES
? FM.  Pregest DMWas having sgnf elevations. Just had pump readjusted 2d ago - good ctrl since then. MSAFP today. Adv to check with her eye doctor if appt needed. RTO 4wks, MFM same day.

## 2018-09-02 LAB
2ND TRIMESTER 4 SCREEN SERPL-IMP: NORMAL
2ND TRIMESTER 4 SCREEN SERPL-IMP: NORMAL
AFP ADJ MOM SERPL: 0.92
AFP SERPL-MCNC: 31.6 NG/ML
AGE AT DELIVERY: 29.5 YR
COMMENTS, 018014: NORMAL
FET TS 18 RISK FROM MAT AGE: NORMAL
FET TS 21 RISK FROM MAT AGE: 741
GA METHOD: NORMAL
GA: 16.5 WEEKS
HCG ADJ MOM SERPL: 1.25
HCG SERPL-ACNC: NORMAL MIU/ML
IDDM PATIENT QL: NO
INHIBIN A ADJ MOM SERPL: 0.87
INHIBIN A SERPL-MCNC: 141.1 PG/ML
MULTIPLE PREGNANCY: NO
NEURAL TUBE DEFECT RISK FETUS: NORMAL %
RESULTS, 017389: NORMAL
TS 18 RISK FETUS: NORMAL
TS 21 RISK FETUS: 3795
U ESTRIOL ADJ MOM SERPL: 0.9
U ESTRIOL SERPL-MCNC: 0.77 NG/ML

## 2018-09-26 ENCOUNTER — HOSPITAL ENCOUNTER (OUTPATIENT)
Dept: PERINATAL CARE | Age: 28
Discharge: HOME | End: 2018-09-26
Attending: OBSTETRICS & GYNECOLOGY
Payer: COMMERCIAL

## 2018-09-26 PROCEDURE — 76811 OB US DETAILED SNGL FETUS: CPT | Performed by: OBSTETRICS & GYNECOLOGY

## 2018-10-09 ENCOUNTER — ROUTINE PRENATAL (OUTPATIENT)
Dept: OBGYN CLINIC | Age: 28
End: 2018-10-09

## 2018-10-09 VITALS
HEART RATE: 96 BPM | BODY MASS INDEX: 27.16 KG/M2 | SYSTOLIC BLOOD PRESSURE: 120 MMHG | HEIGHT: 65 IN | WEIGHT: 163 LBS | DIASTOLIC BLOOD PRESSURE: 83 MMHG

## 2018-10-09 DIAGNOSIS — O24.012 PRE-EXISTING TYPE 1 DIABETES MELLITUS DURING PREGNANCY IN SECOND TRIMESTER: Primary | ICD-10-CM

## 2018-10-09 DIAGNOSIS — Z3A.22 22 WEEKS GESTATION OF PREGNANCY: ICD-10-CM

## 2018-10-09 DIAGNOSIS — R31.9 HEMATURIA, UNSPECIFIED TYPE: ICD-10-CM

## 2018-10-09 LAB — URINALYSIS, EXTERNAL: NEGATIVE

## 2018-10-09 NOTE — PROGRESS NOTES
Right flank pain that moved to back last night -> resolved. UA/C&S. Has endo appt today, sugars fluctuating, thinks may be d/t different tubing. BERTA 10/24. Would like to move YULI to 11/13, OK since she will be seeing RANDALL on 10/24.

## 2018-10-09 NOTE — PATIENT INSTRUCTIONS
Counting Your Baby's Kicks: Care Instructions  Your Care Instructions    Counting your baby's kicks is one way your doctor can tell that your baby is healthy. Most women--especially in a first pregnancy--feel their baby move for the first time between 16 and 22 weeks. The movement may feel like flutters rather than kicks. Your baby may move more at certain times of the day. When you are active, you may notice less kicking than when you are resting. At your prenatal visits, your doctor will ask whether the baby is active. In your last trimester, your doctor may ask you to count the number of times you feel your baby move. Follow-up care is a key part of your treatment and safety. Be sure to make and go to all appointments, and call your doctor if you are having problems. It's also a good idea to know your test results and keep a list of the medicines you take. How do you count fetal kicks? · A common method of checking your baby's movement is to count the number of kicks or moves you feel in 1 hour. Ten movements (such as kicks, flutters, or rolls) in 1 hour are normal. Some doctors suggest that you count in the morning until you get to 10 movements. Then you can quit for that day and start again the next day. · Pick your baby's most active time of day to count. This may be any time from morning to evening. · If you do not feel 10 movements in an hour, your baby may be sleeping. Wait for the next hour and count again. When should you call for help? Call your doctor now or seek immediate medical care if:    · You noticed that your baby has stopped moving or is moving much less than normal.    Watch closely for changes in your health, and be sure to contact your doctor if you have any problems. Where can you learn more? Go to http://anup-pauline.info/. Enter G805 in the search box to learn more about \"Counting Your Baby's Kicks: Care Instructions. \"  Current as of: November 21, 2017  Content Version: 11.8  © 6289-1534 [employer]. Care instructions adapted under license by [employer] (which disclaims liability or warranty for this information). If you have questions about a medical condition or this instruction, always ask your healthcare professional. [address] any warranty or liability for your use of this information.

## 2018-10-10 LAB
APPEARANCE UR: CLEAR
BACTERIA #/AREA URNS HPF: ABNORMAL /[HPF]
BILIRUB UR QL STRIP: NEGATIVE
CASTS URNS QL MICRO: ABNORMAL /LPF
COLOR UR: YELLOW
EPI CELLS #/AREA URNS HPF: >10 /HPF
GLUCOSE UR QL: ABNORMAL
HGB UR QL STRIP: ABNORMAL
KETONES UR QL STRIP: NEGATIVE
LEUKOCYTE ESTERASE UR QL STRIP: ABNORMAL
MICRO URNS: ABNORMAL
MUCOUS THREADS URNS QL MICRO: PRESENT
NITRITE UR QL STRIP: NEGATIVE
PH UR STRIP: 6.5 [PH] (ref 5–7.5)
PROT UR QL STRIP: ABNORMAL
RBC #/AREA URNS HPF: ABNORMAL /HPF
SP GR UR: 1.03 (ref 1–1.03)
UROBILINOGEN UR STRIP-MCNC: 0.2 MG/DL (ref 0.2–1)
WBC #/AREA URNS HPF: ABNORMAL /HPF
YEAST #/AREA URNS HPF: PRESENT /[HPF]

## 2018-10-11 LAB — BACTERIA UR CULT: NORMAL

## 2018-10-24 ENCOUNTER — HOSPITAL ENCOUNTER (OUTPATIENT)
Dept: PERINATAL CARE | Age: 28
Discharge: HOME | End: 2018-10-24
Attending: OBSTETRICS & GYNECOLOGY
Payer: COMMERCIAL

## 2018-10-24 PROCEDURE — 76816 OB US FOLLOW-UP PER FETUS: CPT | Performed by: OBSTETRICS & GYNECOLOGY

## 2018-10-24 NOTE — PROGRESS NOTES
M - Please see the Ultrasound report / consult note to be entered into this patient's record as a scanned document from my office. A text copy of this note is also provided below for convenience. Charmayne Righter, M.D., Ph.D. 
Anirudh Santamaria 
 
--------------------------- Indication: Pre-existing DM Type 1 2nd  Tri  O24.012.  
____________________________________________________________________________ History: Age: 33 years. : 1 Para: 0.  
Current Pregnancy: Blood group: O Rhesus D-negative. Pre- pregnancy data: Weight 153 lbs. Height 5 ft 5 ins. BMI 25.5. 
____________________________________________________________________________ Dating: LMP: 18 EDC: 19 GA by LMP: [id number] Current Scan on: 10/24/18 EDC: 19 GA by current scan: 25w3d Best Overall Assessment: 10/24/18 EDC: 19 Assessed GA: [id number] The calculation of the gestational age by current scan was based on BPD, HC, AC and FL. The Best Overall Assessment is based on the LMP. 
____________________________________________________________________________ General Evaluation: 
Fetal heart activity: present. Fetal heart rate: 173 bpm.  
Presentation: BREECH. Fetal movement: visible. Amniotic Fluid: normal. Maximal vertical pocket 6.6 cm. Cord: 3 vessels. Placenta: anterior. ____________________________________________________________________________ Anatomy Scan: 
Walker Rough gestation. Biometry: 
Fetal Measurements used for the estimation of the gestational age are bolded. BPD 64.0 mm 85th% 25w6d (25w1d to 26w4d) .4 mm 41st% 24w6d (22w6d to 26w6d) .5 mm 62nd% 25w2d (24w4d to 26w0d) FL 46.5 mm 66th% 25w3d (23w3d to 27w4d) OFD 80.0 mm 54th% 24w5d HUM 40.1 mm 37th% 24w0d VENTRp 5.6 mm n/a 
EFW (lbs/oz) 1 lbs 12 ozs EFW (g) 795 g  60th% Fetal Anatomy: 
Visualized with normal appearance: chest, gastrointestinal tract, kidneys, bladder. Heart: The 4-chamber view was obtained but outflow tracts could not be adequately visualized. Summary of Ultrasound Findings: 
Transabdominal US. U/S machine: [employer] E8 Expert. U/S view: limited by fetal position. Impression: The evaluated fetal anatomy appears normal. 
 
____________________________________________________________________________ Fetal Wellbeing Assessment: 
Amniotic fluid: normal. MVP: 6.6 cm.  
 
____________________________________________________________________________ Report Summary: 
Impression: A follow-up study was performed for fetal growth. Ms. Julia Bright has Type 1 Class C diabetes managed with an insulin pump by Dr. Meg Stafford. First trimester HgbA1c was 6.5%. She reports recent challenges with glycemic control and continues to make insulin pump setting changes. We reviewed target glycemia ranges:  fasting < 90-95 mg/dL, 1-hr post-prandial < 130 mg/dL, 2-hr post-prandial < 120 mg/dL   Fetal anatomy survey was normal at 20 weeks. She is low risk for fetal Trisomy 21/18 and ONTD based on second-trimester serum screening. She reports good fetal activity and has no complaints. Single viable IUP with composite biometry consistent with dates is observed. EFW is appropriate for gestational age. Fetal anatomy was not reviewed in detail, but appears normal as indicated above. Normal fetal movements and amniotic fluid measurements are noted. Recommendations: 1. Recommend serial fetal growth ultrasound every 4 weeks. 2.   surveillance starting at 32 weeks until delivery. 3.  Recommend delivery planning for 39 weeks gestational age. 
____________________________________________________________________________ Fetal Growth Overview: 
Date GA BPD [mm] HC [mm] AC [mm] FL [mm] HUM [mm] EFW GP 
18 12 + 4 ... ... ... ... ... ... . .. 
18 20 + 4 50.2 74th 182.7 45th 162.6 69th 35.0 59th 34.0 77th 403g , 0 lbs 14 ozs n/a% 10/24/18 24 + 4 64.0 85th 228.4 41st 206.5 62nd 46.5 66th 40.1 37th 795g , 1 lbs 12 ozs 60th%%

## 2018-11-13 ENCOUNTER — ROUTINE PRENATAL (OUTPATIENT)
Dept: OBGYN CLINIC | Age: 28
End: 2018-11-13

## 2018-11-13 VITALS
RESPIRATION RATE: 16 BRPM | WEIGHT: 169.8 LBS | BODY MASS INDEX: 28.29 KG/M2 | SYSTOLIC BLOOD PRESSURE: 125 MMHG | HEIGHT: 65 IN | DIASTOLIC BLOOD PRESSURE: 87 MMHG

## 2018-11-13 DIAGNOSIS — Z23 ENCOUNTER FOR IMMUNIZATION: ICD-10-CM

## 2018-11-13 DIAGNOSIS — Z67.91 RH NEGATIVE STATE IN ANTEPARTUM PERIOD: Primary | ICD-10-CM

## 2018-11-13 DIAGNOSIS — M54.9 BACK PAIN AFFECTING PREGNANCY, ANTEPARTUM: ICD-10-CM

## 2018-11-13 DIAGNOSIS — O99.891 BACK PAIN AFFECTING PREGNANCY, ANTEPARTUM: ICD-10-CM

## 2018-11-13 DIAGNOSIS — O26.899 RH NEGATIVE STATE IN ANTEPARTUM PERIOD: Primary | ICD-10-CM

## 2018-11-13 DIAGNOSIS — Z3A.27 27 WEEKS GESTATION OF PREGNANCY: ICD-10-CM

## 2018-11-13 DIAGNOSIS — O24.012 PRE-EXISTING TYPE 1 DIABETES MELLITUS DURING PREGNANCY IN SECOND TRIMESTER: ICD-10-CM

## 2018-11-13 LAB
ANTIBODY SCREEN, EXTERNAL: NEGATIVE
HCT, EXTERNAL: 39.8
HGB, EXTERNAL: 13.7
PLATELET CNT,   EXTERNAL: 269

## 2018-11-13 NOTE — PROGRESS NOTES
33year old  27w3d pregnant patient given the 0.5ml flu injection as per MD order. Patient signed consent. Patient tolerated injection in Right deltoid with out complications. Patient given 1500 units Rhogam as per MD order. Patient had blood work drawn prior to the injection. Patient provided with the pamphlet for the Rhogam injection. Patient tolerated the injection in her left gluteus without complications.

## 2018-11-13 NOTE — PROGRESS NOTES
+FM. +back pain (chronic, prior to preg) -> PT. CBC/AbScrn. Rhogam today, HgbA1C per Dr. Jake Brewster. MFM nxt wk. RTO 2-3wks.

## 2018-11-14 LAB
BLD GP AB SCN SERPL QL: NEGATIVE
ERYTHROCYTE [DISTWIDTH] IN BLOOD BY AUTOMATED COUNT: 13 % (ref 12.3–15.4)
HBA1C MFR BLD: 5.6 % (ref 4.8–5.6)
HCT VFR BLD AUTO: 39.8 % (ref 34–46.6)
HGB BLD-MCNC: 13.7 G/DL (ref 11.1–15.9)
MCH RBC QN AUTO: 30.7 PG (ref 26.6–33)
MCHC RBC AUTO-ENTMCNC: 34.4 G/DL (ref 31.5–35.7)
MCV RBC AUTO: 89 FL (ref 79–97)
PLATELET # BLD AUTO: 269 X10E3/UL (ref 150–379)
RBC # BLD AUTO: 4.46 X10E6/UL (ref 3.77–5.28)
WBC # BLD AUTO: 12.1 X10E3/UL (ref 3.4–10.8)

## 2018-11-28 ENCOUNTER — ROUTINE PRENATAL (OUTPATIENT)
Dept: OBGYN CLINIC | Age: 28
End: 2018-11-28

## 2018-11-28 ENCOUNTER — HOSPITAL ENCOUNTER (OUTPATIENT)
Dept: PERINATAL CARE | Age: 28
Discharge: HOME | End: 2018-11-28
Attending: OBSTETRICS & GYNECOLOGY
Payer: COMMERCIAL

## 2018-11-28 VITALS
HEIGHT: 65 IN | BODY MASS INDEX: 28.96 KG/M2 | WEIGHT: 173.8 LBS | SYSTOLIC BLOOD PRESSURE: 118 MMHG | DIASTOLIC BLOOD PRESSURE: 72 MMHG

## 2018-11-28 DIAGNOSIS — Z3A.29 29 WEEKS GESTATION OF PREGNANCY: ICD-10-CM

## 2018-11-28 DIAGNOSIS — O24.012 PRE-EXISTING TYPE 1 DIABETES MELLITUS DURING PREGNANCY IN SECOND TRIMESTER: Primary | ICD-10-CM

## 2018-11-28 DIAGNOSIS — R10.9 RIGHT FLANK PAIN: ICD-10-CM

## 2018-11-28 LAB — URINALYSIS, EXTERNAL: NEGATIVE

## 2018-11-28 PROCEDURE — 76816 OB US FOLLOW-UP PER FETUS: CPT | Performed by: OBSTETRICS & GYNECOLOGY

## 2018-11-28 NOTE — PROGRESS NOTES
Saw MFM today, nl per pt. Kitty mid right back/flank pain -> UCx. RTO , MFM same day. - U=D, conf'd by 7wk US.  OLIVIA=19  - pregestational DM, on pump (to use in labor per endo).  Dr. Thomas Cummings  - baseline 24hr=95mg tot prot; crcl=86  - QS low risk  - rec 81mg ASA after 12wks, incr folate to 2mg daily  - Rh Negative -> Rhogam @ 28wks ** ___  - Rubella equivocal -> MMR PP  - MFM:  Recommendations: 1. Recommend daily low-dose aspirin (81 mg qday) from now until delivery. 2. Continue diabetes management with Dr. Thomas Cummings. Recommended target glycemia ranges: fasting < 90-95  mg/dL, 1-hr post-prandial < 130 mg/dL, 2-hr post-prandial < 120 mg/dL. 3. Recommend a detailed fetal anatomy scan at 19-20 weeks. ** ___  4. Recommend serial fetal growth ultrasound every 4 weeks from 24-28 weeks gestational age. 5.  surveillance starting at 32 weeks until delivery. 6. Recommend delivery planning for 39 weeks gestational age. - MFM US (18) 21+0 @ 20+4. Ant placenta. Nl morph. XX  - MFM US (10/24/18) 25+3 @ 24+4. 795gm (60%).

## 2018-11-30 LAB — BACTERIA UR CULT: NORMAL

## 2018-12-17 ENCOUNTER — HOSPITAL ENCOUNTER (OUTPATIENT)
Dept: PERINATAL CARE | Age: 28
Discharge: HOME | End: 2018-12-17
Attending: OBSTETRICS & GYNECOLOGY
Payer: COMMERCIAL

## 2018-12-17 ENCOUNTER — ROUTINE PRENATAL (OUTPATIENT)
Dept: OBGYN CLINIC | Age: 28
End: 2018-12-17

## 2018-12-17 VITALS
HEART RATE: 95 BPM | WEIGHT: 176.8 LBS | HEIGHT: 65 IN | BODY MASS INDEX: 29.46 KG/M2 | SYSTOLIC BLOOD PRESSURE: 133 MMHG | DIASTOLIC BLOOD PRESSURE: 89 MMHG

## 2018-12-17 DIAGNOSIS — Z23 ENCOUNTER FOR IMMUNIZATION: ICD-10-CM

## 2018-12-17 DIAGNOSIS — O24.013 PRE-EXISTING TYPE 1 DIABETES MELLITUS DURING PREGNANCY IN THIRD TRIMESTER: Primary | ICD-10-CM

## 2018-12-17 DIAGNOSIS — Z3A.32 32 WEEKS GESTATION OF PREGNANCY: ICD-10-CM

## 2018-12-17 PROCEDURE — 76818 FETAL BIOPHYS PROFILE W/NST: CPT | Performed by: OBSTETRICS & GYNECOLOGY

## 2018-12-17 NOTE — PROGRESS NOTES
Patient given TDAP vaccine per MD order. Patient signed consent. TDAP vaccine LOT [id number] EXP 4/3/2021 given in L deltoid per patient request. Patient tolerated injection well, without difficulties. This nurse offered patient time to wait in exam room after injection, patient declines. No signs or symptoms of reactions noted or reported. Patient verbalized understanding to notify office of any reactions immediately.

## 2018-12-17 NOTE — PROGRESS NOTES
No c/o. Sugars a little high -> insulin adjusted per endo. OLIVIA=2/9, tent IOL 2/4. MFM today, cont wkly BPPs. TDAP today. RTO 2wks.

## 2018-12-26 ENCOUNTER — HOSPITAL ENCOUNTER (OUTPATIENT)
Dept: PERINATAL CARE | Age: 28
Discharge: HOME | End: 2018-12-26
Attending: OBSTETRICS & GYNECOLOGY
Payer: COMMERCIAL

## 2018-12-26 PROCEDURE — 76816 OB US FOLLOW-UP PER FETUS: CPT | Performed by: OBSTETRICS & GYNECOLOGY

## 2018-12-26 PROCEDURE — 76818 FETAL BIOPHYS PROFILE W/NST: CPT | Performed by: OBSTETRICS & GYNECOLOGY

## 2019-01-03 ENCOUNTER — ROUTINE PRENATAL (OUTPATIENT)
Dept: OBGYN CLINIC | Age: 29
End: 2019-01-03

## 2019-01-03 ENCOUNTER — HOSPITAL ENCOUNTER (OUTPATIENT)
Dept: PERINATAL CARE | Age: 29
Discharge: HOME | End: 2019-01-03
Attending: OBSTETRICS & GYNECOLOGY
Payer: COMMERCIAL

## 2019-01-03 VITALS
HEIGHT: 65 IN | SYSTOLIC BLOOD PRESSURE: 134 MMHG | WEIGHT: 185 LBS | BODY MASS INDEX: 30.82 KG/M2 | HEART RATE: 86 BPM | DIASTOLIC BLOOD PRESSURE: 92 MMHG

## 2019-01-03 DIAGNOSIS — O16.9 ELEVATED BLOOD PRESSURE AFFECTING PREGNANCY, ANTEPARTUM: ICD-10-CM

## 2019-01-03 DIAGNOSIS — O24.013 PRE-EXISTING TYPE 1 DIABETES MELLITUS DURING PREGNANCY IN THIRD TRIMESTER: Primary | ICD-10-CM

## 2019-01-03 DIAGNOSIS — Z3A.34 34 WEEKS GESTATION OF PREGNANCY: ICD-10-CM

## 2019-01-03 PROCEDURE — 76818 FETAL BIOPHYS PROFILE W/NST: CPT | Performed by: OBSTETRICS & GYNECOLOGY

## 2019-01-03 NOTE — PROGRESS NOTES
Rpt NJ=466/95. +FM. +swelling. No HA, visual changes, RUQ pain. PE: tr edema, DTRs 1+. PIH labs/24hr urine. MFM appt today. RTO 1wk.        - U=D, conf'd by 7wk US.  OLIVIA=19  - pregestational DM, on pump (to use in labor per endo).  Dr. Desmond Godinez  - baseline 24hr=95mg tot prot; crcl=86  - QS low risk  - rec 81mg ASA after 12wks, incr folate to 2mg daily  - Rh Negative -> Rhogam @ 28wks ** ___  - Rubella equivocal -> MMR PP  - MFM:  Recommendations: 1. Recommend daily low-dose aspirin (81 mg qday) from now until delivery. 2. Continue diabetes management with Dr. Desmond Godinez. Recommended target glycemia ranges: fasting < 90-95  mg/dL, 1-hr post-prandial < 130 mg/dL, 2-hr post-prandial < 120 mg/dL. 3. Recommend a detailed fetal anatomy scan at 19-20 weeks. ** ___  4. Recommend serial fetal growth ultrasound every 4 weeks from 24-28 weeks gestational age. 5.  surveillance starting at 32 weeks until delivery. 6. Recommend delivery planning for 39 weeks gestational age. - MFM US (18) 21+0 @ 20+4. Ant placenta. Nl morph. XX  - MFM US (10/24/18) 25+3 @ 24+4. 795gm (60%). - MFM US (18) 30+6 @ 29+4. 1534 (61%). ANG=16.5  - MFM US (18) 34+2 @ 33+4. 2413gm (58%). AC=82%). ANG=13.9. BPP=10/10  - Induction (19) alcazar (). Pt notified.

## 2019-01-03 NOTE — PROGRESS NOTES
Patient reports sinus congestion that started yesterday  Swelling in hands and feet/ankles for 1 week

## 2019-01-04 LAB
ALBUMIN SERPL-MCNC: 3.5 G/DL (ref 3.5–5.5)
ALBUMIN/GLOB SERPL: 1.2 {RATIO} (ref 1.2–2.2)
ALP SERPL-CCNC: 136 IU/L (ref 39–117)
ALT SERPL-CCNC: 22 IU/L (ref 0–32)
AST SERPL-CCNC: 28 IU/L (ref 0–40)
BILIRUB SERPL-MCNC: <0.2 MG/DL (ref 0–1.2)
BUN SERPL-MCNC: 7 MG/DL (ref 6–20)
BUN/CREAT SERPL: 9 (ref 9–23)
CALCIUM SERPL-MCNC: 10 MG/DL (ref 8.7–10.2)
CHLORIDE SERPL-SCNC: 103 MMOL/L (ref 96–106)
CO2 SERPL-SCNC: 20 MMOL/L (ref 20–29)
CREAT SERPL-MCNC: 0.79 MG/DL (ref 0.57–1)
ERYTHROCYTE [DISTWIDTH] IN BLOOD BY AUTOMATED COUNT: 13.6 % (ref 12.3–15.4)
GLOBULIN SER CALC-MCNC: 2.9 G/DL (ref 1.5–4.5)
GLUCOSE SERPL-MCNC: 59 MG/DL (ref 65–99)
HCT VFR BLD AUTO: 38.2 % (ref 34–46.6)
HGB BLD-MCNC: 12.9 G/DL (ref 11.1–15.9)
LDH SERPL-CCNC: 211 IU/L (ref 119–226)
MCH RBC QN AUTO: 30.1 PG (ref 26.6–33)
MCHC RBC AUTO-ENTMCNC: 33.8 G/DL (ref 31.5–35.7)
MCV RBC AUTO: 89 FL (ref 79–97)
PLATELET # BLD AUTO: 233 X10E3/UL (ref 150–379)
POTASSIUM SERPL-SCNC: 4.2 MMOL/L (ref 3.5–5.2)
PROT SERPL-MCNC: 6.4 G/DL (ref 6–8.5)
RBC # BLD AUTO: 4.29 X10E6/UL (ref 3.77–5.28)
SODIUM SERPL-SCNC: 137 MMOL/L (ref 134–144)
URATE SERPL-MCNC: 4.9 MG/DL (ref 2.5–7.1)
WBC # BLD AUTO: 13.2 X10E3/UL (ref 3.4–10.8)

## 2019-01-07 ENCOUNTER — TELEPHONE (OUTPATIENT)
Dept: OBGYN CLINIC | Age: 29
End: 2019-01-07

## 2019-01-07 NOTE — TELEPHONE ENCOUNTER
Call received at 131PM    33year old  35w2d pregnant patient last seen in the office on 1/3/19. Patient denies vaginal bleeding, ROM and reports some maryse peters contractions and positive fetal movement. Patient reports some cramping at night when she lays down. Patient advised to increase her po fluids. Patient calling to ask if she can start the 24 hour urine tomorrow and keep on ice till her 1/10/19 appointment. Patient advised per Lab personnel that as long as she completed the collection on 19 and she keeps the urine in the refrig or on ice till her appointment. Patient state she got a BP cuff and last night her reading was 121/80. Patient denies headache or blurred vision. Patient states she is trying to rest and to keep her legs elevated. Patient verbalized understanding.     [place]

## 2019-01-10 ENCOUNTER — HOSPITAL ENCOUNTER (OUTPATIENT)
Dept: PERINATAL CARE | Age: 29
Discharge: HOME | End: 2019-01-10
Payer: COMMERCIAL

## 2019-01-10 ENCOUNTER — ROUTINE PRENATAL (OUTPATIENT)
Dept: OBGYN CLINIC | Age: 29
End: 2019-01-10

## 2019-01-10 VITALS — DIASTOLIC BLOOD PRESSURE: 86 MMHG | BODY MASS INDEX: 29.99 KG/M2 | SYSTOLIC BLOOD PRESSURE: 138 MMHG | WEIGHT: 180.2 LBS

## 2019-01-10 DIAGNOSIS — Z3A.35 35 WEEKS GESTATION OF PREGNANCY: ICD-10-CM

## 2019-01-10 DIAGNOSIS — O24.013 PRE-EXISTING TYPE 1 DIABETES MELLITUS DURING PREGNANCY IN THIRD TRIMESTER: ICD-10-CM

## 2019-01-10 DIAGNOSIS — O16.3 HYPERTENSION AFFECTING PREGNANCY IN THIRD TRIMESTER: Primary | ICD-10-CM

## 2019-01-10 LAB — GRBS, EXTERNAL: NEGATIVE

## 2019-01-10 PROCEDURE — 76818 FETAL BIOPHYS PROFILE W/NST: CPT | Performed by: OBSTETRICS & GYNECOLOGY

## 2019-01-10 NOTE — PROGRESS NOTES
+FM. Saw MFM today, nl per pt, report pending. Turning in 24hr urine. BPs at home upper 120s/70s-80s. Sugars more stable with new pump. GBS done. RTO 1wk.    - U=D, conf'd by 7wk US.  OLIVIA=19  - pregestational DM, on pump (to use in labor per endo).  Dr. Lieutenant Shaw  - baseline 24hr=95mg tot prot; crcl=86  - QS low risk  - rec 81mg ASA after 12wks, incr folate to 2mg daily  - Rh Negative -> Rhogam @ 28wks ** ___  - Rubella equivocal -> MMR PP  - MFM:  Recommendations: 1. Recommend daily low-dose aspirin (81 mg qday) from now until delivery. 2. Continue diabetes management with Dr. Lieutenant Shaw. Recommended target glycemia ranges: fasting < 90-95  mg/dL, 1-hr post-prandial < 130 mg/dL, 2-hr post-prandial < 120 mg/dL. 3. Recommend a detailed fetal anatomy scan at 19-20 weeks. ** ___  4. Recommend serial fetal growth ultrasound every 4 weeks from 24-28 weeks gestational age. 5.  surveillance starting at 32 weeks until delivery. 6. Recommend delivery planning for 39 weeks gestational age. - MFM US (18) 21+0 @ 20+4. Ant placenta. Nl morph. XX  - MFM US (10/24/18) 25+3 @ 24+4. 795gm (60%). - MFM US (18) 30+6 @ 29+4. 1534 (61%). ANG=16.5  - MFM US (18) 34+2 @ 33+4. 2413gm (58%). AC=82%). ANG=13.9. BPP=10/10  - Induction (18) alcazar (). Pt notified.

## 2019-01-12 LAB
GP B STREP DNA SPEC QL NAA+PROBE: NEGATIVE
PROT 24H UR-MRATE: 354 MG/24 HR (ref 30–150)
PROT UR-MCNC: 16.1 MG/DL

## 2019-01-15 ENCOUNTER — ROUTINE PRENATAL (OUTPATIENT)
Dept: OBGYN CLINIC | Age: 29
End: 2019-01-15

## 2019-01-15 ENCOUNTER — HOSPITAL ENCOUNTER (OUTPATIENT)
Dept: PERINATAL CARE | Age: 29
Discharge: HOME | End: 2019-01-15
Attending: OBSTETRICS & GYNECOLOGY
Payer: COMMERCIAL

## 2019-01-15 ENCOUNTER — TELEPHONE (OUTPATIENT)
Dept: OBGYN CLINIC | Age: 29
End: 2019-01-15

## 2019-01-15 VITALS
RESPIRATION RATE: 16 BRPM | DIASTOLIC BLOOD PRESSURE: 97 MMHG | HEART RATE: 102 BPM | SYSTOLIC BLOOD PRESSURE: 129 MMHG | WEIGHT: 182.4 LBS | HEIGHT: 65 IN | BODY MASS INDEX: 30.39 KG/M2

## 2019-01-15 DIAGNOSIS — O16.3 HYPERTENSION AFFECTING PREGNANCY IN THIRD TRIMESTER: ICD-10-CM

## 2019-01-15 DIAGNOSIS — Z3A.36 36 WEEKS GESTATION OF PREGNANCY: ICD-10-CM

## 2019-01-15 DIAGNOSIS — O24.013 PRE-EXISTING TYPE 1 DIABETES MELLITUS DURING PREGNANCY IN THIRD TRIMESTER: Primary | ICD-10-CM

## 2019-01-15 PROCEDURE — 76818 FETAL BIOPHYS PROFILE W/NST: CPT | Performed by: OBSTETRICS & GYNECOLOGY

## 2019-01-15 NOTE — PROGRESS NOTES
A little more swelling, +carpal tunnel. No HA, visual changes, RUQ/RICKEY pain. PE: tr edema. Saw endo this AM, reports initial DBP ~97 (rpt in 80s). At home usually 120s/80s. Had MFM US today, report pending. 24hr urine with 354mg tot prot. I will d/w MFM to see about recommendations for timing of delivery.

## 2019-01-18 ENCOUNTER — ROUTINE PRENATAL (OUTPATIENT)
Dept: OBGYN CLINIC | Age: 29
End: 2019-01-18

## 2019-01-18 VITALS
HEIGHT: 65 IN | BODY MASS INDEX: 30.49 KG/M2 | WEIGHT: 183 LBS | SYSTOLIC BLOOD PRESSURE: 141 MMHG | HEART RATE: 120 BPM | DIASTOLIC BLOOD PRESSURE: 88 MMHG

## 2019-01-18 DIAGNOSIS — O14.93 PRE-ECLAMPSIA IN THIRD TRIMESTER: Primary | ICD-10-CM

## 2019-01-18 NOTE — PROGRESS NOTES
+FM.  Swelling worse. cvx lg/cl, softening a little. IOL 1/22 for pre-e, FB 1/21 with Dr. Lakisha Wall. Rpt [facility] labs today.

## 2019-01-18 NOTE — PATIENT INSTRUCTIONS
Weeks 34 to 36 of Your Pregnancy: Care Instructions  Your Care Instructions    By now, your baby and your belly have grown quite large. It is almost time to give birth. A full-term pregnancy can deliver between 37 and 42 weeks. Your baby's lungs are almost ready to breathe air. The bones in your baby's head are now firm enough to protect it, but soft enough to move down through the birth canal.  You may feel excited, happy, anxious, or scared. You may wonder how you will know if you are in labor or what to expect during labor. Try to be flexible in your expectations of the birth. Because each birth is different, there is no way to know exactly what childbirth will be like for you. This care sheet will help you know what to expect and how to prepare. This may make your childbirth easier. If you haven't already had the Tdap shot during this pregnancy, talk to your doctor about getting it. It will help protect your  against pertussis infection. In the 36th week, most women have a test for group B streptococcus (GBS). GBS is a common bacteria that can live in the vagina and rectum. It can make your baby sick after birth. If you test positive, you will get antibiotics during labor. The medicine will keep your baby from getting the bacteria. Follow-up care is a key part of your treatment and safety. Be sure to make and go to all appointments, and call your doctor if you are having problems. It's also a good idea to know your test results and keep a list of the medicines you take. How can you care for yourself at home? Learn about pain relief choices  · Pain is different for every woman. Talk with your doctor about your feelings about pain. · You can choose from several types of pain relief. These include medicine or breathing techniques, as well as comfort measures. You can use more than one option. · If you choose to have pain medicine during labor, talk to your doctor about your options.  Some medicines lower anxiety and help with some of the pain. Others make your lower body numb so that you won't feel pain. · Be sure to tell your doctor about your pain medicine choice before you start labor or very early in your labor. You may be able to change your mind as labor progresses. · Rarely, a woman is put to sleep by medicine given through a mask or an IV. Labor and delivery  · The first stage of labor has three parts: early, active, and transition. ? Most women have early labor at home. You can stay busy or rest, eat light snacks, drink clear fluids, and start counting contractions. ? When talking during a contraction gets hard, you may be moving to active labor. During active labor, you should head for the hospital if you are not there already. ? You are in active labor when contractions come every 3 to 4 minutes and last about 60 seconds. Your cervix is opening more rapidly. ? If your water breaks, contractions will come faster and stronger. ? During transition, your cervix is stretching, and contractions are coming more rapidly. ? You may want to push, but your cervix might not be ready. Your doctor will tell you when to push. · The second stage starts when your cervix is completely opened and you are ready to push. ? Contractions are very strong to push the baby down the birth canal.  ? You will feel the urge to push. You may feel like you need to have a bowel movement. ? You may be coached to push with contractions. These contractions will be very strong, but you will not have them as often. You can get a little rest between contractions. ? You may be emotional and irritable. You may not be aware of what is going on around you.  ? One last push, and your baby is born. · The third stage is when a few more contractions push out the placenta. This may take 30 minutes or less. · The fourth stage is the welcome recovery. You may feel overwhelmed with emotions and exhausted but alert.  This is a good time to start breastfeeding. Where can you learn more? Go to http://anup-pauline.info/. Enter Q636 in the search box to learn more about \"Weeks 34 to 36 of Your Pregnancy: Care Instructions. \"  Current as of: September 5, 2018  Content Version: 11.9  © 8478-2450 [employer]. Care instructions adapted under license by [employer] (which disclaims liability or warranty for this information). If you have questions about a medical condition or this instruction, always ask your healthcare professional. [address] any warranty or liability for your use of this information.

## 2019-01-19 LAB
ALBUMIN SERPL-MCNC: 3.5 G/DL (ref 3.5–5.5)
ALBUMIN/GLOB SERPL: 1.5 {RATIO} (ref 1.2–2.2)
ALP SERPL-CCNC: 163 IU/L (ref 39–117)
ALT SERPL-CCNC: 18 IU/L (ref 0–32)
AST SERPL-CCNC: 21 IU/L (ref 0–40)
BILIRUB SERPL-MCNC: 0.2 MG/DL (ref 0–1.2)
BUN SERPL-MCNC: 8 MG/DL (ref 6–20)
BUN/CREAT SERPL: 10 (ref 9–23)
CALCIUM SERPL-MCNC: 8.7 MG/DL (ref 8.7–10.2)
CHLORIDE SERPL-SCNC: 102 MMOL/L (ref 96–106)
CO2 SERPL-SCNC: 18 MMOL/L (ref 20–29)
CREAT SERPL-MCNC: 0.82 MG/DL (ref 0.57–1)
ERYTHROCYTE [DISTWIDTH] IN BLOOD BY AUTOMATED COUNT: 13.7 % (ref 12.3–15.4)
GLOBULIN SER CALC-MCNC: 2.3 G/DL (ref 1.5–4.5)
GLUCOSE SERPL-MCNC: 186 MG/DL (ref 65–99)
HCT VFR BLD AUTO: 35.7 % (ref 34–46.6)
HGB BLD-MCNC: 11.9 G/DL (ref 11.1–15.9)
LDH SERPL-CCNC: 200 IU/L (ref 119–226)
MCH RBC QN AUTO: 30.1 PG (ref 26.6–33)
MCHC RBC AUTO-ENTMCNC: 33.3 G/DL (ref 31.5–35.7)
MCV RBC AUTO: 90 FL (ref 79–97)
PLATELET # BLD AUTO: 190 X10E3/UL (ref 150–379)
POTASSIUM SERPL-SCNC: 4.3 MMOL/L (ref 3.5–5.2)
PROT SERPL-MCNC: 5.8 G/DL (ref 6–8.5)
RBC # BLD AUTO: 3.96 X10E6/UL (ref 3.77–5.28)
SODIUM SERPL-SCNC: 135 MMOL/L (ref 134–144)
URATE SERPL-MCNC: 5.8 MG/DL (ref 2.5–7.1)
WBC # BLD AUTO: 10 X10E3/UL (ref 3.4–10.8)

## 2019-01-21 ENCOUNTER — HOSPITAL ENCOUNTER (INPATIENT)
Age: 29
LOS: 3 days | Discharge: HOME | End: 2019-01-24
Attending: OBSTETRICS & GYNECOLOGY | Admitting: OBSTETRICS & GYNECOLOGY
Payer: COMMERCIAL

## 2019-01-21 ENCOUNTER — ROUTINE PRENATAL (OUTPATIENT)
Dept: OBGYN CLINIC | Age: 29
End: 2019-01-21

## 2019-01-21 VITALS
HEIGHT: 65 IN | SYSTOLIC BLOOD PRESSURE: 145 MMHG | BODY MASS INDEX: 30.66 KG/M2 | RESPIRATION RATE: 19 BRPM | WEIGHT: 184 LBS | DIASTOLIC BLOOD PRESSURE: 79 MMHG | HEART RATE: 110 BPM

## 2019-01-21 DIAGNOSIS — Z3A.37 37 WEEKS GESTATION OF PREGNANCY: ICD-10-CM

## 2019-01-21 DIAGNOSIS — O14.93 PRE-ECLAMPSIA IN THIRD TRIMESTER: Primary | ICD-10-CM

## 2019-01-21 DIAGNOSIS — R52 POSTPARTUM PAIN: Primary | ICD-10-CM

## 2019-01-21 PROBLEM — O14.90 PREECLAMPSIA: Status: ACTIVE | Noted: 2019-01-21

## 2019-01-21 PROBLEM — Z34.90 PREGNANT AND NOT YET DELIVERED: Status: ACTIVE | Noted: 2019-01-21

## 2019-01-21 LAB
ALBUMIN SERPL-MCNC: 2.5 G/DL (ref 3.5–5)
ALBUMIN/GLOB SERPL: 0.6 {RATIO} (ref 1.1–2.2)
ALP SERPL-CCNC: 209 U/L (ref 45–117)
ALT SERPL-CCNC: 23 U/L (ref 12–78)
ANION GAP SERPL CALC-SCNC: 13 MMOL/L (ref 5–15)
AST SERPL-CCNC: 24 U/L (ref 15–37)
BASOPHILS # BLD: 0 K/UL (ref 0–0.1)
BASOPHILS NFR BLD: 0 % (ref 0–1)
BILIRUB SERPL-MCNC: 0.3 MG/DL (ref 0.2–1)
BUN SERPL-MCNC: 12 MG/DL (ref 6–20)
BUN/CREAT SERPL: 13 (ref 12–20)
CALCIUM SERPL-MCNC: 8.9 MG/DL (ref 8.5–10.1)
CHLORIDE SERPL-SCNC: 103 MMOL/L (ref 97–108)
CO2 SERPL-SCNC: 22 MMOL/L (ref 21–32)
CREAT SERPL-MCNC: 0.9 MG/DL (ref 0.55–1.02)
CREAT UR-MCNC: 226.09 MG/DL
DIFFERENTIAL METHOD BLD: ABNORMAL
EOSINOPHIL # BLD: 0.1 K/UL (ref 0–0.4)
EOSINOPHIL NFR BLD: 1 % (ref 0–7)
ERYTHROCYTE [DISTWIDTH] IN BLOOD BY AUTOMATED COUNT: 12.8 % (ref 11.5–14.5)
GLOBULIN SER CALC-MCNC: 3.9 G/DL (ref 2–4)
GLUCOSE SERPL-MCNC: 148 MG/DL (ref 65–100)
HCT VFR BLD AUTO: 35.1 % (ref 35–47)
HGB BLD-MCNC: 12.1 G/DL (ref 11.5–16)
IMM GRANULOCYTES # BLD AUTO: 0.1 K/UL (ref 0–0.04)
IMM GRANULOCYTES NFR BLD AUTO: 1 % (ref 0–0.5)
LYMPHOCYTES # BLD: 2.3 K/UL (ref 0.8–3.5)
LYMPHOCYTES NFR BLD: 22 % (ref 12–49)
MCH RBC QN AUTO: 30.6 PG (ref 26–34)
MCHC RBC AUTO-ENTMCNC: 34.5 G/DL (ref 30–36.5)
MCV RBC AUTO: 88.9 FL (ref 80–99)
MONOCYTES # BLD: 0.6 K/UL (ref 0–1)
MONOCYTES NFR BLD: 6 % (ref 5–13)
NEUTS SEG # BLD: 7.4 K/UL (ref 1.8–8)
NEUTS SEG NFR BLD: 71 % (ref 32–75)
NRBC # BLD: 0 K/UL (ref 0–0.01)
NRBC BLD-RTO: 0 PER 100 WBC
PLATELET # BLD AUTO: 217 K/UL (ref 150–400)
PMV BLD AUTO: 12.6 FL (ref 8.9–12.9)
POTASSIUM SERPL-SCNC: 4.4 MMOL/L (ref 3.5–5.1)
PROT SERPL-MCNC: 6.4 G/DL (ref 6.4–8.2)
PROT UR-MCNC: 136 MG/DL (ref 0–11.9)
PROT/CREAT UR-RTO: 0.6
RBC # BLD AUTO: 3.95 M/UL (ref 3.8–5.2)
SODIUM SERPL-SCNC: 138 MMOL/L (ref 136–145)
WBC # BLD AUTO: 10.5 K/UL (ref 3.6–11)

## 2019-01-21 PROCEDURE — 3E033VJ INTRODUCTION OF OTHER HORMONE INTO PERIPHERAL VEIN, PERCUTANEOUS APPROACH: ICD-10-PCS | Performed by: OBSTETRICS & GYNECOLOGY

## 2019-01-21 PROCEDURE — 59200 INSERT CERVICAL DILATOR: CPT | Performed by: OBSTETRICS & GYNECOLOGY

## 2019-01-21 PROCEDURE — 10907ZC DRAINAGE OF AMNIOTIC FLUID, THERAPEUTIC FROM PRODUCTS OF CONCEPTION, VIA NATURAL OR ARTIFICIAL OPENING: ICD-10-PCS | Performed by: OBSTETRICS & GYNECOLOGY

## 2019-01-21 PROCEDURE — 4A0HXCZ MEASUREMENT OF PRODUCTS OF CONCEPTION, CARDIAC RATE, EXTERNAL APPROACH: ICD-10-PCS | Performed by: OBSTETRICS & GYNECOLOGY

## 2019-01-21 PROCEDURE — 75410000002 HC LABOR FEE PER 1 HR: Performed by: OBSTETRICS & GYNECOLOGY

## 2019-01-21 PROCEDURE — 36415 COLL VENOUS BLD VENIPUNCTURE: CPT

## 2019-01-21 PROCEDURE — 80053 COMPREHEN METABOLIC PANEL: CPT

## 2019-01-21 PROCEDURE — 65270000029 HC RM PRIVATE

## 2019-01-21 PROCEDURE — 74011250637 HC RX REV CODE- 250/637: Performed by: OBSTETRICS & GYNECOLOGY

## 2019-01-21 PROCEDURE — 76060000078 HC EPIDURAL ANESTHESIA: Performed by: NURSE ANESTHETIST, CERTIFIED REGISTERED

## 2019-01-21 PROCEDURE — 75410000000 HC DELIVERY VAGINAL/SINGLE: Performed by: OBSTETRICS & GYNECOLOGY

## 2019-01-21 PROCEDURE — 85025 COMPLETE CBC W/AUTO DIFF WBC: CPT

## 2019-01-21 PROCEDURE — 75410000003 HC RECOV DEL/VAG/CSECN EA 0.5 HR: Performed by: OBSTETRICS & GYNECOLOGY

## 2019-01-21 PROCEDURE — 84156 ASSAY OF PROTEIN URINE: CPT

## 2019-01-21 PROCEDURE — 77010026065 HC OXYGEN MINIMUM MEDICAL AIR: Performed by: OBSTETRICS & GYNECOLOGY

## 2019-01-21 RX ORDER — LIDOCAINE HYDROCHLORIDE 10 MG/ML
20 INJECTION INFILTRATION; PERINEURAL ONCE
Status: ACTIVE | OUTPATIENT
Start: 2019-01-21 | End: 2019-01-22

## 2019-01-21 RX ORDER — MAGNESIUM SULFATE 100 %
4 CRYSTALS MISCELLANEOUS AS NEEDED
Status: DISCONTINUED | OUTPATIENT
Start: 2019-01-21 | End: 2019-01-24 | Stop reason: HOSPADM

## 2019-01-21 RX ORDER — BISMUTH SUBSALICYLATE 262 MG
1 TABLET,CHEWABLE ORAL DAILY
COMMUNITY

## 2019-01-21 RX ORDER — ONDANSETRON 2 MG/ML
4 INJECTION INTRAMUSCULAR; INTRAVENOUS
Status: DISCONTINUED | OUTPATIENT
Start: 2019-01-21 | End: 2019-01-23 | Stop reason: HOSPADM

## 2019-01-21 RX ORDER — MINERAL OIL
120 OIL (ML) ORAL ONCE
Status: ACTIVE | OUTPATIENT
Start: 2019-01-21 | End: 2019-01-22

## 2019-01-21 RX ORDER — MAG HYDROX/ALUMINUM HYD/SIMETH 200-200-20
30 SUSPENSION, ORAL (FINAL DOSE FORM) ORAL
Status: DISCONTINUED | OUTPATIENT
Start: 2019-01-21 | End: 2019-01-23 | Stop reason: HOSPADM

## 2019-01-21 RX ORDER — NALOXONE HYDROCHLORIDE 0.4 MG/ML
0.4 INJECTION, SOLUTION INTRAMUSCULAR; INTRAVENOUS; SUBCUTANEOUS AS NEEDED
Status: DISCONTINUED | OUTPATIENT
Start: 2019-01-21 | End: 2019-01-23 | Stop reason: HOSPADM

## 2019-01-21 RX ORDER — SODIUM CHLORIDE, SODIUM LACTATE, POTASSIUM CHLORIDE, CALCIUM CHLORIDE 600; 310; 30; 20 MG/100ML; MG/100ML; MG/100ML; MG/100ML
125 INJECTION, SOLUTION INTRAVENOUS CONTINUOUS
Status: DISCONTINUED | OUTPATIENT
Start: 2019-01-21 | End: 2019-01-21

## 2019-01-21 RX ORDER — DEXTROSE MONOHYDRATE 25 G/50ML
12.5-25 INJECTION, SOLUTION INTRAVENOUS AS NEEDED
Status: DISCONTINUED | OUTPATIENT
Start: 2019-01-21 | End: 2019-01-24 | Stop reason: HOSPADM

## 2019-01-21 RX ORDER — SODIUM CHLORIDE 0.9 % (FLUSH) 0.9 %
5-40 SYRINGE (ML) INJECTION EVERY 8 HOURS
Status: DISCONTINUED | OUTPATIENT
Start: 2019-01-21 | End: 2019-01-23

## 2019-01-21 RX ORDER — ASPIRIN 81 MG/1
81 TABLET ORAL DAILY
COMMUNITY
End: 2019-01-24

## 2019-01-21 RX ORDER — SODIUM CHLORIDE, SODIUM LACTATE, POTASSIUM CHLORIDE, CALCIUM CHLORIDE 600; 310; 30; 20 MG/100ML; MG/100ML; MG/100ML; MG/100ML
125 INJECTION, SOLUTION INTRAVENOUS CONTINUOUS
Status: DISCONTINUED | OUTPATIENT
Start: 2019-01-22 | End: 2019-01-24 | Stop reason: HOSPADM

## 2019-01-21 RX ORDER — ZOLPIDEM TARTRATE 5 MG/1
5 TABLET ORAL
Status: DISCONTINUED | OUTPATIENT
Start: 2019-01-21 | End: 2019-01-24 | Stop reason: HOSPADM

## 2019-01-21 RX ORDER — SODIUM CHLORIDE 0.9 % (FLUSH) 0.9 %
5-40 SYRINGE (ML) INJECTION AS NEEDED
Status: DISCONTINUED | OUTPATIENT
Start: 2019-01-21 | End: 2019-01-23

## 2019-01-21 RX ORDER — ACETAMINOPHEN 325 MG/1
650 TABLET ORAL
Status: DISCONTINUED | OUTPATIENT
Start: 2019-01-21 | End: 2019-01-24 | Stop reason: HOSPADM

## 2019-01-21 RX ORDER — OXYTOCIN/0.9 % SODIUM CHLORIDE 30/500 ML
0-25 PLASTIC BAG, INJECTION (ML) INTRAVENOUS
Status: DISCONTINUED | OUTPATIENT
Start: 2019-01-22 | End: 2019-01-24 | Stop reason: HOSPADM

## 2019-01-21 RX ADMIN — ACETAMINOPHEN 650 MG: 325 TABLET ORAL at 22:00

## 2019-01-21 RX ADMIN — ZOLPIDEM TARTRATE 5 MG: 5 TABLET ORAL at 22:00

## 2019-01-21 NOTE — H&P
History & Physical 
 
Name: Crystal Lyle MRN: [id number]  SSN: [id number] YOB: 1990  Age: 33 y.o. Sex: female Subjective:  
 
Estimated Date of Delivery: 19 OB History  Para Term  AB Living 1 0 0 0 0 0 SAB TAB Ectopic Molar Multiple Live Births  
 0 0 0   0 MsZeenat Shaw is admitted with pregnancy at 42w2d for induction of labor. Prenatal course was complicated by diabetes - Type 1 and mild PIH. Please see prenatal records for details. Past Medical History:  
Diagnosis Date  Diabetes mellitus ([facility] [address] Type I  
 Encounter for insertion of mirena IUD 2012  Encounter for IUD removal 2012  
 due to chronic cramping  Goiter 2009  Pap smear for cervical cancer screening 16 Negative (no hpv) Past Surgical History:  
Procedure Laterality Date  HX LIPOSUCTION  2013  HX ORTHOPAEDIC    
 foot surgery  
 
@ Social History Socioeconomic History  Marital status:  Spouse name: Not on file  Number of children: Not on file  Years of education: Not on file  Highest education level: Not on file Social Needs  Financial resource strain: Not on file  Food insecurity - worry: Not on file  Food insecurity - inability: Not on file  Transportation needs - medical: Not on file  Transportation needs - non-medical: Not on file Occupational History  Not on file Tobacco Use  Smoking status: Never Smoker  Smokeless tobacco: Never Used  Tobacco comment: Never used vapor or e-cigs Substance and Sexual Activity  Alcohol use: No  
 Drug use: No  
 Sexual activity: Yes  
  Partners: Male Birth control/protection: None Other Topics Concern  Not on file Social History Narrative  Not on file Family History Problem Relation Age of Onset  Diabetes Paternal Grandmother Type I  
 Hypertension Maternal Grandmother  Hypertension Mother No Known Allergies Prior to Admission medications Medication Sig Start Date End Date Taking? Authorizing Provider HUMALOG U-100 INSULIN 100 unit/mL injection  8/29/18   Provider, Historical  
PNV38-Iron Cbn&Gluc-FA-DSS-DHA 35-1- mg cmpk Take  by mouth. Provider, Historical  
B.infantis-B.ani-B.long-B.bifi (PROBIOTIC 4X) 10-15 mg TbEC Take  by mouth. Provider, Historical  
ondansetron (ZOFRAN ODT) 4 mg disintegrating tablet Take 1 Tab by mouth every eight (8) hours as needed for Nausea. 6/28/18   Trace Vasquez MD  
insulin glargine (LANTUS) 100 unit/mL injection by SubCUTAneous route nightly. Provider, Historical  
OTHER     Provider, Historical  
insulin aspart (NOVOLOG) 100 unit/mL injection 15 Units by SubCUTAneous route as needed. Felisha Melissa MD  
INSULIN LISPRO (HUMALOG SC) 0.9 Units/hr by SubCUTAneous route daily. Continuous basal rate    Other, MD Felisha  
  
 
Review of Systems: A comprehensive review of systems was negative except for that written in the HPI. Objective:  
 
Vitals: There were no vitals filed for this visit. Physical Exam: 
Chest: clear Heart: RRR Abdomen: Gravid Cervix: 30/0.5/-3/vtx Membranes:  Intact Fetal Heart Rate: Reactive Prenatal Labs:  
Lab Results Component Value Date/Time  
 Rubella, External Equivocal 06/28/2018 GrBStrep, External Negative  01/10/2019 HBsAg, External Negative  06/28/2018 HIV, External Negative  06/28/2018 Gonorrhea, External Negative  06/28/2018 Chlamydia, External Negative  06/28/2018 Assessment/Plan:  
 
Plan: Admit for induction of labor due to mild PIH and pre- gestational diabetes. Pt will manage her own insulin tonight with her pump. Sabineye Afshin cervical dilator balloon placed in the office. Will check [address] [facility] labs on admit. See prenatal labs. Signed By:  Juan Roberts MD   
 January 21, 2019

## 2019-01-21 NOTE — PROGRESS NOTES
yeppt Cervical Dilator insertion procedure note    Kallie Moeller is a ,  33 y.o. female who presents today far placement of a alcazar catheter in the cervix for ripening. Her cervix is unfavorable and she is scheduled for induction tomorrow. She has elected to have a cervical alcazar catheter placement today. The risks, benefits and assets of the procedure were discussed. Her questions were answered. PROCEDURE: The vagina and cervix was prepped with Zephiran. A yeppt cervical catheter was placed through the cervix without difficulty. The distal uterine  bulb was inflated with 60 cc of saline and then the vaginal bulb was inflated to 60 cc. . Bleeding was minimal. The patient's level of discomfort was minimal. The catheter was taped to the patients thigh. POST PROCEDURE: The patient tolerated the procedure well. There were no complications. She was  admitted as an outpatient for monitoring overnight. She will manage her own BS with her pump tonight.

## 2019-01-22 ENCOUNTER — ANESTHESIA (OUTPATIENT)
Dept: LABOR AND DELIVERY | Age: 29
End: 2019-01-22
Payer: COMMERCIAL

## 2019-01-22 ENCOUNTER — ANESTHESIA EVENT (OUTPATIENT)
Dept: LABOR AND DELIVERY | Age: 29
End: 2019-01-22
Payer: COMMERCIAL

## 2019-01-22 PROCEDURE — 74011250636 HC RX REV CODE- 250/636: Performed by: OBSTETRICS & GYNECOLOGY

## 2019-01-22 PROCEDURE — 74011000250 HC RX REV CODE- 250

## 2019-01-22 PROCEDURE — 77030014125 HC TY EPDRL BBMI -B: Performed by: ANESTHESIOLOGY

## 2019-01-22 PROCEDURE — 00HU33Z INSERTION OF INFUSION DEVICE INTO SPINAL CANAL, PERCUTANEOUS APPROACH: ICD-10-PCS | Performed by: ANESTHESIOLOGY

## 2019-01-22 PROCEDURE — 3E0R3BZ INTRODUCTION OF ANESTHETIC AGENT INTO SPINAL CANAL, PERCUTANEOUS APPROACH: ICD-10-PCS | Performed by: ANESTHESIOLOGY

## 2019-01-22 PROCEDURE — 75410000002 HC LABOR FEE PER 1 HR: Performed by: OBSTETRICS & GYNECOLOGY

## 2019-01-22 PROCEDURE — 74011250636 HC RX REV CODE- 250/636: Performed by: NURSE ANESTHETIST, CERTIFIED REGISTERED

## 2019-01-22 PROCEDURE — 0DQR0ZZ REPAIR ANAL SPHINCTER, OPEN APPROACH: ICD-10-PCS | Performed by: OBSTETRICS & GYNECOLOGY

## 2019-01-22 PROCEDURE — 65270000029 HC RM PRIVATE

## 2019-01-22 PROCEDURE — 74011000250 HC RX REV CODE- 250: Performed by: NURSE ANESTHETIST, CERTIFIED REGISTERED

## 2019-01-22 RX ORDER — IBUPROFEN 800 MG/1
800 TABLET ORAL
Status: DISCONTINUED | OUTPATIENT
Start: 2019-01-22 | End: 2019-01-24 | Stop reason: HOSPADM

## 2019-01-22 RX ORDER — BUPIVACAINE HYDROCHLORIDE 2.5 MG/ML
INJECTION, SOLUTION EPIDURAL; INFILTRATION; INTRACAUDAL AS NEEDED
Status: DISCONTINUED | OUTPATIENT
Start: 2019-01-22 | End: 2019-01-22 | Stop reason: HOSPADM

## 2019-01-22 RX ORDER — EPHEDRINE SULFATE 50 MG/ML
INJECTION, SOLUTION INTRAVENOUS
Status: DISCONTINUED
Start: 2019-01-22 | End: 2019-01-22 | Stop reason: WASHOUT

## 2019-01-22 RX ORDER — MORPHINE SULFATE 4 MG/ML
2 INJECTION INTRAVENOUS ONCE
Status: COMPLETED | OUTPATIENT
Start: 2019-01-22 | End: 2019-01-22

## 2019-01-22 RX ORDER — LIDOCAINE HYDROCHLORIDE AND EPINEPHRINE 15; 5 MG/ML; UG/ML
INJECTION, SOLUTION EPIDURAL
Status: COMPLETED | OUTPATIENT
Start: 2019-01-22 | End: 2019-01-22

## 2019-01-22 RX ORDER — OXYTOCIN/RINGER'S LACTATE 20/1000 ML
125-500 PLASTIC BAG, INJECTION (ML) INTRAVENOUS ONCE
Status: ACTIVE | OUTPATIENT
Start: 2019-01-23 | End: 2019-01-23

## 2019-01-22 RX ORDER — SIMETHICONE 80 MG
80 TABLET,CHEWABLE ORAL
Status: DISCONTINUED | OUTPATIENT
Start: 2019-01-22 | End: 2019-01-24 | Stop reason: HOSPADM

## 2019-01-22 RX ORDER — FENTANYL/BUPIVACAINE/NS/PF 2-1250MCG
1-16 PREFILLED PUMP RESERVOIR EPIDURAL CONTINUOUS
Status: DISCONTINUED | OUTPATIENT
Start: 2019-01-22 | End: 2019-01-23 | Stop reason: HOSPADM

## 2019-01-22 RX ORDER — HYDROCODONE BITARTRATE AND ACETAMINOPHEN 5; 325 MG/1; MG/1
1 TABLET ORAL
Status: DISCONTINUED | OUTPATIENT
Start: 2019-01-22 | End: 2019-01-24 | Stop reason: HOSPADM

## 2019-01-22 RX ORDER — DOCUSATE SODIUM 100 MG/1
100 CAPSULE, LIQUID FILLED ORAL
Status: DISCONTINUED | OUTPATIENT
Start: 2019-01-22 | End: 2019-01-24 | Stop reason: HOSPADM

## 2019-01-22 RX ORDER — HYDROCORTISONE ACETATE PRAMOXINE HCL 2.5; 1 G/100G; G/100G
CREAM TOPICAL AS NEEDED
Status: DISCONTINUED | OUTPATIENT
Start: 2019-01-22 | End: 2019-01-24 | Stop reason: HOSPADM

## 2019-01-22 RX ORDER — PROMETHAZINE HYDROCHLORIDE 25 MG/1
25 TABLET ORAL
Status: DISCONTINUED | OUTPATIENT
Start: 2019-01-22 | End: 2019-01-24 | Stop reason: HOSPADM

## 2019-01-22 RX ORDER — NALOXONE HYDROCHLORIDE 0.4 MG/ML
0.4 INJECTION, SOLUTION INTRAMUSCULAR; INTRAVENOUS; SUBCUTANEOUS AS NEEDED
Status: DISCONTINUED | OUTPATIENT
Start: 2019-01-22 | End: 2019-01-24 | Stop reason: HOSPADM

## 2019-01-22 RX ADMIN — Medication 10 ML/HR: at 19:56

## 2019-01-22 RX ADMIN — OXYTOCIN-SODIUM CHLORIDE 0.9% IV SOLN 30 UNIT/500ML 2 MILLI-UNITS/MIN: 30-0.9/5 SOLUTION at 03:59

## 2019-01-22 RX ADMIN — BUPIVACAINE HYDROCHLORIDE 1 ML: 2.5 INJECTION, SOLUTION EPIDURAL; INFILTRATION; INTRACAUDAL at 15:32

## 2019-01-22 RX ADMIN — Medication 10 ML/HR: at 14:28

## 2019-01-22 RX ADMIN — Medication 10 ML: at 03:58

## 2019-01-22 RX ADMIN — BUPIVACAINE HYDROCHLORIDE 8 ML: 2.5 INJECTION, SOLUTION EPIDURAL; INFILTRATION; INTRACAUDAL at 17:53

## 2019-01-22 RX ADMIN — SODIUM CHLORIDE, SODIUM LACTATE, POTASSIUM CHLORIDE, AND CALCIUM CHLORIDE 250 ML: 600; 310; 30; 20 INJECTION, SOLUTION INTRAVENOUS at 15:26

## 2019-01-22 RX ADMIN — SODIUM CHLORIDE, SODIUM LACTATE, POTASSIUM CHLORIDE, AND CALCIUM CHLORIDE 125 ML/HR: 600; 310; 30; 20 INJECTION, SOLUTION INTRAVENOUS at 11:26

## 2019-01-22 RX ADMIN — BUPIVACAINE HYDROCHLORIDE 4 ML: 2.5 INJECTION, SOLUTION EPIDURAL; INFILTRATION; INTRACAUDAL at 14:10

## 2019-01-22 RX ADMIN — SODIUM CHLORIDE, SODIUM LACTATE, POTASSIUM CHLORIDE, AND CALCIUM CHLORIDE 125 ML/HR: 600; 310; 30; 20 INJECTION, SOLUTION INTRAVENOUS at 03:59

## 2019-01-22 RX ADMIN — MORPHINE SULFATE 2 MG: 4 INJECTION, SOLUTION INTRAMUSCULAR; INTRAVENOUS at 22:46

## 2019-01-22 RX ADMIN — SODIUM CHLORIDE, SODIUM LACTATE, POTASSIUM CHLORIDE, AND CALCIUM CHLORIDE 1000 ML: 600; 310; 30; 20 INJECTION, SOLUTION INTRAVENOUS at 13:00

## 2019-01-22 RX ADMIN — BUPIVACAINE HYDROCHLORIDE 4 ML: 2.5 INJECTION, SOLUTION EPIDURAL; INFILTRATION; INTRACAUDAL at 14:19

## 2019-01-22 RX ADMIN — SODIUM CHLORIDE, SODIUM LACTATE, POTASSIUM CHLORIDE, AND CALCIUM CHLORIDE 125 ML/HR: 600; 310; 30; 20 INJECTION, SOLUTION INTRAVENOUS at 15:40

## 2019-01-22 RX ADMIN — BUPIVACAINE HYDROCHLORIDE 4 ML: 2.5 INJECTION, SOLUTION EPIDURAL; INFILTRATION; INTRACAUDAL at 19:58

## 2019-01-22 RX ADMIN — LIDOCAINE HYDROCHLORIDE AND EPINEPHRINE 5 ML: 15; 5 INJECTION, SOLUTION EPIDURAL at 15:34

## 2019-01-22 RX ADMIN — ONDANSETRON 4 MG: 2 INJECTION INTRAMUSCULAR; INTRAVENOUS at 15:38

## 2019-01-22 RX ADMIN — BUPIVACAINE HYDROCHLORIDE 4 ML: 2.5 INJECTION, SOLUTION EPIDURAL; INFILTRATION; INTRACAUDAL at 14:00

## 2019-01-22 NOTE — PROGRESS NOTES
Glucose @ 1830 - 129 
 
1910 - Bedside and Verbal shift change report given to Khadra Ortega RN (oncoming nurse) by Marylee Sages, RN (offgoing nurse). Report included the following information SBAR, Intake/Output, MAR, Recent Results and Med Rec Status.

## 2019-01-22 NOTE — PROGRESS NOTES
S - feeling ctx, not significantly changed from earlier this AM. 
Pit @ 15, did increase to 16, but had tachysystole, dropped back down to 15. Continuing to manage glu via her pump. However, DTC has come by to place orders for glucose stabilzer I the event we want to switch over. O - FBI=931, reactive, mod variability, +accels, had one small variable just after AROM Ctx 1 2-3, pit @ 15 Cvx=4/50/-3/ceph/post 
AROM -> clear fluid His=804 OL=102/85 A&P - balloon/pit IOL for pregest DM, mild pre-e. No severe range BPs 
- she will continue to adjust pump with goal of keeping her <120 
- will decrease pit to 10, cont to adjust as needed

## 2019-01-22 NOTE — PROGRESS NOTES
S - has epidural, needed to be replaced. Starting to feel ctx again. Sugars bumped while she was very uncomfortable, most recent check back down to 122. O - TPL=934, reactive, mod variability, occ mild variable decel Ctx q 2-3 min, pit @ 12 Cvx=5/80/-1/OT Vli=149 KN=146/91 (did bump while she was very uncomfortable SBP up to 198, DBP up to 98) A&P - 31yo  @ 37+3wks. IOL for mild pre-e, pre-gestational DM. - continue pitocin 
- recheck in 2-4hrs

## 2019-01-22 NOTE — ANESTHESIA PROCEDURE NOTES
CSE Block Start time: 1/22/2019 3:31 PM 
End time: 1/22/2019 3:31 PM 
Performed by: Markel Castelan MD 
Authorized by: Markel Castelan MD  
 
Pre-Procedure 
preanesthetic checklist: patient identified, risks and benefits discussed, anesthesia consent, site marked, patient being monitored and timeout performed Timeout Time: 15:31 Procedure:  
Patient Position:  Seated Prep Region:  Lumbar Prep: chlorhexidine Location:  L2-3 Epidural Needle:  
Needle Gauge:  18 G Injection Technique:  Loss of resistance using air Attempts:  1 Spinal Needle:  
Needle Type:  Pencil-tip Needle Gauge:  27 G Catheter:  
Catheter at Skin Depth (cm):  11 Depth in Epidural Space (cm):  5 Events: no blood with aspiration, no paresthesia and CSF confirmed Test Dose:  Negative Assessment:  
Catheter Secured:  Tegaderm and tape Insertion:  Uncomplicated Patient tolerance:  Patient tolerated the procedure well with no immediate complications Previous epidural giving poor relief + mild paresthesia left leg. Catheter removed. Tip intact. CSE done without difficulty.

## 2019-01-22 NOTE — PROGRESS NOTES
S - alcazar still in place; feeling ctx, but not uncomfortable O - FAI=782, reactive, mod variability, +accels, no decels Ctx q 3-5 min, pit @ 12 
     cvx - double balloon in place, ?1cm BY=253/66 Zqa=050 A&P - 33yo  @ 37+2. Pregestational DM on insulin pump. Mild pre-e. 
- She is managing her insulin pump as per her endocrinologists recommendations. Discussed usual goal of maintaining glu , she will adjust pump. She has some concerns about managing her pump when she is in active labor and pushing. Will consult DTC for glucose control as well so that we have that available. - No severe range BPs. PIH labs nl. 
- will continue pitocin, recheck in a couple of hours

## 2019-01-22 NOTE — ANESTHESIA PROCEDURE NOTES
Epidural Block Start time: 1/22/2019 1:48 PM 
End time: 1/22/2019 2:22 PM 
Performed by: Nguyen Cardozo CRNA Authorized by: Nguyen Cardozo CRNA Pre-Procedure Indication: labor epidural   
Preanesthetic Checklist: patient identified, risks and benefits discussed, anesthesia consent, site marked, patient being monitored, timeout performed and anesthesia consent Timeout Time: 13:48 Epidural:  
Patient position:  Seated Prep region:  Lumbar Prep: Chlorhexidine Location:  L3-4 Needle and Epidural Catheter:  
Needle Type:  Tuohy Needle Gauge:  17 G Injection Technique:  Loss of resistance using air Attempts:  3 Catheter Size:  19 G Catheter at Skin Depth (cm):  11 Events: no blood with aspiration, no cerebrospinal fluid with aspiration, no paresthesia and negative aspiration test   
Test Dose:  Negative Assessment:  
Catheter Secured:  Tegaderm and tape Insertion:  Uncomplicated Patient tolerance:  Patient tolerated the procedure well with no immediate complications

## 2019-01-22 NOTE — ANESTHESIA PREPROCEDURE EVALUATION
Anesthetic History Review of Systems / Medical History Patient summary reviewed, nursing notes reviewed and pertinent labs reviewed Pulmonary Within defined limits Neuro/Psych Within defined limits Cardiovascular Hypertension: well controlled Comments: Pre Eclampsia GI/Hepatic/Renal 
Within defined limits Endo/Other Diabetes: well controlled, type 1 Pertinent negatives: No hypothyroidism Other Findings Comments: goiter Physical Exam 
 
Airway Mallampati: I 
TM Distance: > 6 cm Neck ROM: normal range of motion Cardiovascular Regular rate and rhythm,  S1 and S2 normal,  no murmur, click, rub, or gallop Rhythm: regular Rate: normal 
 
 
 
 Dental 
No notable dental hx Pulmonary Breath sounds clear to auscultation Abdominal 
GI exam deferred Other Findings Anesthetic Plan ASA: 2 Anesthesia type: epidural 
 
 
 
 
 
Anesthetic plan and risks discussed with: Patient and Family

## 2019-01-22 NOTE — DIABETES MGMT
DTC Pump and Consult Note Recommendations/ Comments: Pregnant 37wk2d, admitted for induced delivery. On pitocin gtt. Type 1 DM on Tandem insulin pump with Dex Com sensor I spoke with Ivette and her nurse, Maykel Carrizales. Ivette feels confident managing her pump at this time. However, she is concerned about managing it during active labor. Explained how the insulin gtt makes calculations which keep her BG stable. She can wear her pump until she feels she can no longer manage it and the gtt can be started To make the transition - start the gtt 
                                      - stop her pump - remove the infusion set from her body 
                                     -  she may put the pump into a temporary basal rate of 0% x 24 hours while she is off her pump She may continue to wear her sensor so that it can alert her to BG changes. However, her BG needs to be checked with the hospital meter and that result used for dosing Current hospital DM medication: Tandem insulin pump If patient needs to be removed from pump for greater than 90 minutes then insulin must be replaced with basal Lantus and Humalog correction scale plus meal bolus or insulin gtt If patient needs an MRI, CT scan or Xray:  
1. Insulin pump must be disconnected at the infusion site and left outside of the         room 2. If using a Sure T or TruSteel infusion set it also must be removed completely. 3. If patient is wearing a continuous glucose monitor (sensor) then it must be             completely removed. Consult received for: [x]          Assessment of home management 
   []          Outpatient education 
   [x]          Insulin pump patient [x]          Insulin infusion for labor and delivery []          DKA/HHS Chart reviewed and initial evaluation complete on [facility]. Patient is a 33 y.o. female with known Type 1 DM. Pt is known to this clinician from the outpt setting - I trained her on her first pump a few years ago. She sees Endocrinologist, Mili Reyna MD  [phone] Insulin Pump Settings Tandem insulin pump with Dex Com sensor and Basal IQ Her pump settings after delivery are Basal Rates 12AM 1.4 
4AM   2.0 
9PM   1.6 Carb ratio 1:6 (was 1:1 during pregnancy) 
correction factor 12 AM 40 
4AM 35 Target 120 Insulin duration 3 hours BG monitoring at home wears the Dex Com sensor If no to any of the following the pump should be d/c per hospital policy: 
· Patient able to program doctors ordered settings,   yes [x]            No   [] · Patient able to provide pump materials  yes [x]              No   [] · Able to change infusion set and fill a new syringe at least for 3 days ( 2 days if pregnant. yes [x]              No   [] · Patient will change setting if redness, swelling, blood backing up, pump alerts, \"No Delivery\" alarm, or two or more glucose reading  
      in a row greater than 250mg/dl    yes [x]              No   [] · Patient able to repeat basal rates [x]              No   [] · Patient able to bolus corrections and meals based on physician orders. yes [x]         No   [] · Assessed and instructed patient on the following:  
·  blood sugar goals, liberalize after delivery · hypoglycemia prevention increased risk of after delivery and while lactating Provided patient with the following: []          Survival skills education materials 
             []          CHO counting education materials [x]          Outpatient DTC contact number - she has this A1c:  
Lab Results Component Value Date/Time Hemoglobin A1c 5.6 11/13/2018 01:52 PM  
 
 
Recent Glucose Results:  
Lab Results Component Value Date/Time  (H) 01/21/2019 04:34 PM  
  
 
Lab Results Component Value Date/Time Creatinine 0.90 01/21/2019 04:34 PM  
 
Estimated Creatinine Clearance: 98.4 mL/min (based on SCr of 0.9 mg/dL). Active Orders Diet DIET CLEAR LIQUID Radha Carne PO intake: No data found. Will continue to follow as needed. Thank you. Hamida Gomez RN, CDE Pager [phone] Time spent: 40 min

## 2019-01-22 NOTE — PROGRESS NOTES
3: 37 AM blood sugar 137 this morning per patient pump. 
7:13 AM SBAR bedside report to Alona Cottrell RN. Care of patient released at this time.

## 2019-01-22 NOTE — PROGRESS NOTES
S - feeling ctx, but not particularly uncomfortable O - BQP=100, reactive Ctx q 2-4 min, pit @ 14 Cvx=3-4cm/50%/-3/ceph/POSTERIOR/med Cervical alcazar removed A&P -  
- cont pitocin 
- cvx still very posterior, so will defer AROM. Recheck ~2hrs, anticipate will be able to AROM at that time.

## 2019-01-22 NOTE — PROGRESS NOTES
0725 - Glucose 150 per insulin pump. 
 
0745 - Dr. Lauren Blanco at bedside discussing [address]. SVE - 1cm, alcazar bulb still firmly in place. VORB to have diabetes treatment team consult with patient regarding insulin pump and possible use of glucostabilizer when patient is in active labor. POC to have alcazar bulb remain in place with pitocin running until MD rechecks or unless alcazar bulb comes out. Patient and family agreeable to this POC. Patient up to bathroom at this time. Foam topper placed on upper part of labor bed for comfort. VORB from Dr. Lauren Blanco to change diet to clear liquids at this time (diabetic with sugar free popsicles) 7443 - Patient utilizing birthing ball at this time. [employer] wireless monitor being used. US and TOCO adjusted. 9296 - Diabetes treatment center called for consult, no answer, will try back. 4831 - Diabetes treatment center RN called back, discussed patient POC with insulin pump and possible glucostabilizer later in labor. RN will come discuss POC with patient and possibly put I orders for insulin drip to being PRN if patient desires. 0920 - TOCO switched out. Contractions being picked up inverted at this time due to patient position right lateral.  
 
1000 - [facility]. Vaginal balloon on FB deflated, SVE - 3-4/50/-3. Alcazar bulb removed per MD by deflating. MD will not AROM at this time r/t posterior cervix and fetal station. 1009 - Glucose at this time 122. Diabetes treatment nurse at bedside discussing POC. When/if glucostabilizer is used, patient will need POC glucose testing 1020 - Insulin drip orders place PRN when patient no longer wants to manage her own insulin pump. Patients target glucose range is , carb ratio is 1:1 but diabetes RN Flora stated for insulin drip set ratio to 6:1, patients basil rate is 2 at this time and correction dose is 1:20. Flora RN pager number is [phone].  Patient has insulin pump settings which need to be changed (by patient) after delivery and BEFORE first meal after delivery, settings have been placed on patient chart. 1050 - Patient ambulating in hallway at this time with [employer] wireless monitor. US and TOCO adjusted. 1105 - Utilizing birthing ball, [address] and TOCO adjusted. 0 - Dr. Tesfaye Clayton at bedside assessing progression of labor. SVE - 4/50/-2, AROM at this time, moderate amount of clear (slightly blood tinged) fluid noted, pads changed, epidural bolus initiated. Glucose at this time is 126. 
 
1245 - Early deceleration noted, Dr. Tesfaye Clayton reviewed FHR tracing, pitocin decreased to 10. 
 
1348 - Patient sitting up on side of bed for epidural placement. Timeout at this time. 1425 - Patient repositioned on right side Patient feeling slight numbness on left side. Bolus button pressed. 1435 - Patient still feeling contractions. 1445 - Patient still feeling contractions. 1450 - REAL Crane on unit. Informed of patient reports. Will pass on to oncoming anesthesiologist for replaced, patient agreeable to this plan. 56 - Dr. Lex Lamar called, walking on unit at this time. 1518 - Patient sitting up for epidural placement, timeout at this time. 1528 - Patient reposiitoned in bed after epdiural, feet feeling warm and tingling. Epidural restarted. Glucose at this time 146, patient given self insulin bolus. 1720 - Patient feeling some cramping on right side, turned to the right side and epidural bolus button pressed. 65 - Dr. Tesfaye Clayton at bedside, assessing progression of labor. SVE = 5/80/-1 soft anterior. Glucose now 122. Patient turned right lateral, still feeling cramping on right side, epidural bolus button pressed again. Peanut ball between knees in trendelenburg. 56 - Dr. Shaila Watkins at bedside assessing epidural breakthrough pain. Bolus given. 6586 - Patient desires to reposition sitting. 1850 - Glucose at this time is 130. 1920 - Bedside and Verbal shift change report given to Chinmay Meek RN (oncoming nurse) by Clifford Robert RN (offgoing nurse). Report included the following information SBAR, Procedure Summary, Intake/Output, MAR, Recent Results and Med Rec Status.

## 2019-01-23 PROCEDURE — 85461 HEMOGLOBIN FETAL: CPT

## 2019-01-23 PROCEDURE — 36415 COLL VENOUS BLD VENIPUNCTURE: CPT

## 2019-01-23 PROCEDURE — 74011250637 HC RX REV CODE- 250/637: Performed by: OBSTETRICS & GYNECOLOGY

## 2019-01-23 PROCEDURE — 65270000029 HC RM PRIVATE

## 2019-01-23 PROCEDURE — 86900 BLOOD TYPING SEROLOGIC ABO: CPT

## 2019-01-23 PROCEDURE — 74011250636 HC RX REV CODE- 250/636: Performed by: OBSTETRICS & GYNECOLOGY

## 2019-01-23 PROCEDURE — 77030021125

## 2019-01-23 RX ADMIN — IBUPROFEN 800 MG: 800 TABLET ORAL at 22:24

## 2019-01-23 RX ADMIN — HYDROCODONE BITARTRATE AND ACETAMINOPHEN 1 TABLET: 5; 325 TABLET ORAL at 03:19

## 2019-01-23 RX ADMIN — IBUPROFEN 800 MG: 800 TABLET ORAL at 03:19

## 2019-01-23 RX ADMIN — Medication 10 ML: at 09:55

## 2019-01-23 RX ADMIN — HYDROCODONE BITARTRATE AND ACETAMINOPHEN 1 TABLET: 5; 325 TABLET ORAL at 10:02

## 2019-01-23 RX ADMIN — HUMAN RHO(D) IMMUNE GLOBULIN 0.3 MG: 300 INJECTION, SOLUTION INTRAMUSCULAR at 17:53

## 2019-01-23 RX ADMIN — DOCUSATE SODIUM 100 MG: 100 CAPSULE, LIQUID FILLED ORAL at 09:54

## 2019-01-23 RX ADMIN — IBUPROFEN 800 MG: 800 TABLET ORAL at 14:16

## 2019-01-23 RX ADMIN — ACETAMINOPHEN 650 MG: 325 TABLET ORAL at 21:34

## 2019-01-23 NOTE — PROGRESS NOTES
01/23/19 10:39 AM 
CM met with SHERON and her /FOB Lindy ([phone]) to complete initial assessment and to begin discharge. Demographics were reviewed and confirmed; SHERON and FOB live together in [place] [address] and this is their first baby. Both work and will have adequate time away from work. Family supports available locally to assist as needed. SHERON is breastfeeding and Harvmitra Rossgm at [facility] will provide medical follow up for the baby. Patient has car seat, crib, clothing, and other necessary supplies. Denied need for [facility] and Medicaid services. Care Management Interventions PCP Verified by CM: Yes(Dr. Anna Zuñiga, endocrinologist as PCP per pt) Mode of Transport at Discharge: Self Transition of Care Consult (CM Consult): Discharge Planning Current Support Network: Lives with Spouse, Family Lives [place] Confirm Follow Up Transport: Family Plan discussed with Pt/Family/Caregiver: Yes Discharge Location Discharge Placement: Home with outpatient services ANN Correa

## 2019-01-23 NOTE — LACTATION NOTE
This note was copied from a baby's chart. Mother called for lactation assistance with breastfeeding. Baby placed in biological nurturing position and put to breast. Baby sleepy - several attempts made to wake baby but baby not interested in breastfeeding at this time - mother tried for 10 minutes. Mother then able to easily hand express 20 drops of colostrum and finger feed to baby. Baby placed in basinet to sleep. Reviewed feeding cues with mother. Mother to call [facility] if she needs further assistance.

## 2019-01-23 NOTE — PROGRESS NOTES
TRANSFER - IN REPORT: 
 
Verbal report received from Kolton Olivarez RN(name) on [employer] for routine progression of care Report consisted of patients Situation, Background, Assessment and  
Recommendations(SBAR). Information from the following report(s) SBAR, Intake/Output and Recent Results was reviewed with the receiving nurse. Opportunity for questions and clarification was provided. Assessment completed upon patients arrival to unit and care assumed. - Blood sugar per patient dexcom, [address] Dr. Jaydon Harris at bedside dosing patients epidural. Pt complains of pain on her right side. Will wait to see if dose sufficient for patient's pain. - Fluid bolus for elevated heart rate. Maternal temperature 98.0 
 
- Delivery table and warmer prepared. Blood sugar per patient dexcom 129 
 
- SVE 10/ C/ +2 
 
- Blood sugar per patient dexcom 152 
 
- Bullard cath drained, 400 cc 
 
- Call to Dr. Uzma Salmeron notified of SVE and intent to start pushing. - Pushing initiated. -Arthur Osborne in room. Status of pushing evaluated. Call when ready for delivery. - Blood sugar per dexcom reading 158 
 
- Pt gave self 2 units of Insulin, Blood sugar 160 
 
- Dr. Uzma Salmeron at bedside - Dr. Uzma Salmeron out of room - Blood sugar per dexcom reading 167 
 
- Dr. Uzma Salmeron called to room for delivery - Blood sugar 174, Pt gave . 94 Insulin - Pt up in [facility] 36-  of live female infant. See delivery note. - Morphine 2 mg IVP now for pain for repair per Dr. Uzma Salmeron 
 
-Dr. Uzma Salmeron remains at bedside for repair. Left sidewall tear and partial third degree. - Blood sugar per dexcom reading 185. Pt gave self 0.93 Insulin. - Dr. Uzma Salmeron remains at bedside for repair. Pt straight cath's for 50 cc of clear yellow urine. QBL for delivery 725 mL 
 
0040- Blood sugar per dexcom reading 180. Pt gave self Insulin, 1.40 units. 0115- Patient changed Insulin pump settings per endocrine order. Patient's pump settings set to basal rates 12am-4am: 1.4, 4am-10pm:1.6, 10pm-MN: 1.20. Pt requests endocrine consult in am 
 
0220- Blood sugar per dexcom reading 190. Pt gave self Insulin, 1.4 units. 0300- Call to Dr. Verner Fiddler and notified of pt's blood sugars. DIscussed plan of care, consult for am, and expectant management. Pt will stay on L&D. If blood sugars get above 250 will transition to Insulin drip. 
 
0313- Blood sugar per dexcom reading 213. Pt gave self Insulin, 4 units. 7916- RHIG profile drawn from left hand, 23 g butterfly by MALA Jacobson Shows, [address] 0320- Pt up to void. Pt voided 250 cc of blood tinged urine. Pt ambulated to restroom well with assist. 
 
0425- Blood sugar per dexcom reading 197. 
 
0450- Blood sugar per dexcom reading 190 
 
0558- Blood sugar per dexcom reading 179. Pt gave self insulin through pump. 
 
0625- Blood sugar through Descom reading 149. 
 
0648- Blood sugar through Dexcom reading 144.  Pt gave self 0.39 units of Insulin through pump 
 
0702- Bedside report to Naresh Thrasher RN from TRAN [facility]

## 2019-01-23 NOTE — PROGRESS NOTES
TRANSFER - IN REPORT: 
 
Verbal report received from Cedric Rocha RN (name) on [employer]  being received from Labor and Delivery (unit) for routine progression of care Report consisted of patients Situation, Background, Assessment and  
Recommendations(SBAR). Information from the following report(s) SBAR, Kardex, Intake/Output, MAR and Recent Results was reviewed with the receiving nurse. Opportunity for questions and clarification was provided. Assessment completed upon patients arrival to unit and care assumed.

## 2019-01-23 NOTE — PROGRESS NOTES
Post-Partum Day Number 1 Progress Note Patient doing well post-partum without significant complaint. Bottom is sore. She is voiding without difficulty, she reports normal lochia. She is ambulatiing without dizziness. Her pain is well controlled with oral pain medication - motrin and norco. She is tolerating general diet. Breast feeding Sugars were elevated. Insulin pump adjusted per Dr. Toyin Elder recommendations. Vitals:   
Patient Vitals for the past 8 hrs: 
 BP Temp Pulse Resp  
19 0747 130/80 98 °F (36.7 °C) (!) 103 16  
19 0447 122/82  (!) 107 16 Temp (24hrs), Av.2 °F (36.8 °C), Min:97.9 °F (36.6 °C), Max:98.8 °F (37.1 °C) Exam:  Patient without distress. Lungs: CTA bilaterally CV: regular rate/rhythm, no rubs or gallops, no murmur Abdomen soft, nontender, nondistended, normal bowel sounds Uterus: fundus firm at level of umbilicus, nontender Lower extremities are negative for cords or tenderness, 1+ swelling. Labs:  
Recent Results (from the past 24 hour(s)) RH IMMUNE GLOBULIN EVAL-LAB ORDER Collection Time: 19  4:23 AM  
Result Value Ref Range ABO/Rh(D) O NEGATIVE Fetal screen NEG   
 WEAK D NEG Unit number [id number] Blood component type RH IMMUNE GLOBULIN Unit division 00 Status of unit ALLOCATED Lab Results Component Value Date/Time  
 Rubella, External Equivocal 2018 GrBStrep, External Negative  01/10/2019 HBsAg, External Negative  2018 HIV, External Negative  2018 Gonorrhea, External Negative  2018 Chlamydia, External Negative  2018 Assessment and Plan:   Postpartum Day #1 S/P . Doing well.  
 - routine PP care 
 - insulin pump per Dr. Desmond Godinez 
 - anticipate discharge in AM

## 2019-01-23 NOTE — ROUTINE PROCESS
0700 - Bedside shift change report given to George Do RN (oncoming nurse) by Joyce Chavez RN (offgoing nurse). Report included the following information SBAR, Kardex, Procedure Summary, Intake/Output, MAR, Accordion, Recent Results and Med Rec Status. Patient in bed, woke up for report. Returned back to sleep, will return for assessment. Patient delivered at [phone], using insulin pump to regulate blood sugars. Endocrinology consult in. 
 
8741 - Patient's blood sugar is 125 with dexcom. 0747 - /80.  
0750 - Patient ambulated to bathroom with assist, steady on her feet. Output 225. Patient ok to get up by herself. Gave patient menu to order food. 7360 - Patient in bed resting, talking with family. Blood sugar 103, breakfast is at bedside. Patient had changed insulin pump settings per Endocrinology's order last night after delivery to her new settings of basal rate 12am-4am 1.4, 4am-10pm 1.6, 10pm-MN 1.20. Correction 1:35 from 4am-12am, 1:40 12am-4am. Carb ratio 1:6. Target . Patient said she has no needs at this time. 1000 - 1hr Post prandial 174 and trending down. 1014 - Left message with Endocrinology for Dr. Garza to call back to unit whenever available. Blood bank called to unit to say that rhogam was ready for pickup whenever. Patient said that she has visitors coming in a few minutes and would rather not take the rhogam at this time. Wants to take later today. 36 - Dr. Garza called back to unit, reviewed postpartum insulin pump settings with Dr. Garza and patient's concern for the carb ratio and her increased blood glucose post delivery. Radha Tyler for patient to change her carb ratio from 1:6 to 1:5 and to give a temp basal @ 120% for a 4hr run and may continue if blood sugar still increased. Ok to call back with any questions. 1036 - Patient in bed breastfeeding with lactation consultant's help, updated patient with instructions from Dr. Garza.  Reassured patient that with pain and stress it is normal for her blood sugars to be a little bit elevated and that Dr. ROUSSEAU  [place] ([facility]) said not to be as concerned for the occasional higher reading now postpartum than in pregnancy. 1130 - Blood sugar 190 before eating. 1415 - Blood sugar elevated at 240 and trending down, patient monitoring per Dr. ROUSSEAU  [place] (1-) recommendations. Call Dr. ROUSSEAU  [facility] ([facility]) if it goes above 250.  
 
1425 - TRANSFER - OUT REPORT: 
 
Verbal report given to Rylan Christopher RN(name) on [employer]  being transferred to [facility] (unit) for routine progression of care Report consisted of patients Situation, Background, Assessment and  
Recommendations(SBAR). Information from the following report(s) SBAR, Kardex, Procedure Summary, Intake/Output, MAR, Accordion, Recent Results and Med Rec Status was reviewed with the receiving nurse. Lines:  
Peripheral IV 01/21/19 Anterior; Left Forearm (Active) Site Assessment Clean, dry, & intact 1/23/2019  7:00 AM  
Phlebitis Assessment 0 1/23/2019  7:00 AM  
Infiltration Assessment 0 1/23/2019  7:00 AM  
Dressing Status Clean, dry, & intact 1/23/2019  7:00 AM  
Dressing Type Transparent;Tape 1/23/2019  7:00 AM  
Hub Color/Line Status Pink;Capped 1/23/2019  7:00 AM  
Action Taken Blood drawn 1/21/2019  4:41 PM  
Alcohol Cap Used Yes 1/23/2019  7:00 AM  
   
Subcutaneous Insulin Infusion Device 01/23/19 (Active) Site Assessment Clean, dry, & intact 1/23/2019  9:25 AM  
Dressing Status Clean, dry, & intact; Intact 1/23/2019  9:25 AM  
Pump continued on admission? Yes 1/23/2019  9:25 AM  
Patient able to care for pump? Yes 1/23/2019  9:25 AM  
Extra supplies available? Yes 1/23/2019  9:25 AM  
Was pump agreement signed?  Yes 1/23/2019  9:25 AM  
Patient reported basal rate  1.6 1/23/2019  9:25 AM  
Usual carb ratio (per pt report) 1:5 1/23/2019 10:37 AM  
[place] volume at admission (mL) 160 mL 1/23/2019  9:50 AM  
BOLUS (in Units) given via pump 3 1/23/2019  9:50 AM  
  
 
 Opportunity for questions and clarification was provided. Patient transported with: 
 Registered Nurse, baby in bassinet, personal belongings. Verbal report given in the patient's room in labor and delivery, MIU nurse to transfer upstairs.

## 2019-01-23 NOTE — L&D DELIVERY NOTE
Delivery Summary    Patient: Toni Doran MRN: [id number]  SSN: [id number]    YOB: 1990  Age: 33 y.o. Sex: female       Information for the patient's : Sujata Valadez, Venecia Ivette [[id number]]       Labor Events:    Labor: No   Rupture Date: 2019   Rupture Time: 12:37 PM   Rupture Type AROM   Amniotic Fluid Volume:      Amniotic Fluid Description: Clear;Blood stained     Induction: Bullard Bulb (balloon); Oxytocin       Augmentation:     Labor Events:       Cervical Ripenin2019 3:00 PM Bullard/EASI     Delivery Events:  Episiotomy:     Laceration(s): Partial third degree perineal;Other (comment) Left sidewall   Repaired: Yes    Number of Repair Packets: 3   Suture Type and Size: Vicryl 2-0  Other chromic-0   Estimated Blood Loss (ml): 500ml       Delivery Date: 2019    Delivery Time: 10:27 PM  Delivery Type: Vaginal, Spontaneous  Sex:  Female     Gestational Age: 42w2d   Delivery Clinician:  Virginia Nolasco  Living Status: Living   Delivery Location: L&D Room 204          APGARS  One minute Five minutes Ten minutes   Skin color: 2   2        Heart rate: 2   2        Grimace: 2   2        Muscle tone: 2   2        Breathin   1        Totals: 9   9            Presentation: Vertex    Position: Right Occiput Anterior  Resuscitation Method:  None     Meconium Stained: None      Cord Information: 3 Vessels  Complications: Nuchal Cord Without Compressions  Cord around: head  Delayed cord clamping? Yes  Cord clamped date/time:2019 10:31 PM  Disposition of Cord Blood: Lab    Blood Gases Sent?: No    Placenta:  Date/Time: 2019 10:47 PM  Removal: Spontaneous      Appearance: Normal      Measurements:  Birth Weight:        Birth Length:        Head Circumference:        Chest Circumference:       Abdominal Girth:       Other Providers:   Chi PEREIRA;BRIANNE TRIPP;MANUEL YBARRA;Rodriguez REMY, Obstetrician;Primary Nurse;Primary  Nurse; Anesthesiologist;Scrub Tech        living female. ANA. Left shoulder anterior. Nuchal cord x1 - reduced. Placenta delivered spont, intact. I&O cath for ~50cc urine. Repair under epidural, local with ~15cc 1% lidocaine, plus 2mg morphine IV. Left sidewall lac repaired in running locking fashion with 2.0-vicryl. Partial 3rd degree (>50% through sphincter). Reapproximated with interrupted stitches of 2.0-vicryl x3. Vaginal portion of lac reapproximated with 2.0-vicryl in running locking fashion to level of introitus. Remainder of repair in usual fashion with 0-chromic. Exploration of Wound: yes  Length of the repair: total length of repair ~6cm  Depth and/or layered closure repair (e.g. Notation of subcutaneous, fascial, muscle, undermining, stents, retention sutures): sphincter muscle repaired with interrupted stitches. Vaginal portion repaired in running locking fashion. 2nd degree perineal repaired with chromic. No stents, retention sutrues. Vessel ligation and/or fulguration: none  Debridement: none    Group B Strep:   Lab Results   Component Value Date/Time    GrBStrep, External Negative  01/10/2019     Information for the patient's : Selvin Navarro, Female Ivette [[id number]]   No results found for: ABORH, PCTABR, PCTDIG, BILI, ABORHEXT, ABORH    No results for input(s): PCO2CB, PO2CB, HCO3I, SO2I, IBD, PTEMPI, SPECTI, PHICB, ISITE, IDEV, IALLEN in the last 72 hours.

## 2019-01-23 NOTE — LACTATION NOTE
This note was copied from a baby's chart. Assisted mother with waking baby, positioning, and latching at breast.  Baby drowsy and spitty since birth. Mother has been feeding drops. Mother can easily express large drops to baby. Assisted mother with side-lying position, baby latched well with rhythmic sucking noted. BF basics reviewed. Discussed with mother her plan for feeding. Reviewed the benefits of exclusive breast milk feeding during the hospital stay. Informed her of the risks of using formula to supplement in the first few days of life as well as the benefits of successful breast milk feeding; referred her to the Breastfeeding booklet about this information. She acknowledges understanding of information reviewed and states that it is her plan to breastfeed her infant. Will support her choice and offer additional information as needed. Reviewed breastfeeding basics:  How milk is made and normal  breastfeeding behaviors discussed. Supply and demand,  stomach size, early feeding cues, skin to skin bonding with comfortable positioning and baby led latch-on reviewed. How to identify signs of successful breastfeeding sessions reviewed; education on assymetrical latch, signs of effective latching vs shallow, in-effective latching, normal  feeding frequency and duration and expected infant output discussed. Normal course of breastfeeding discussed including the AAP's recommendation that children receive exclusive breast milk feedings for the first six months of life with breast milk feedings to continue through the first year of life and/or beyond as complimentary table foods are added. Breastfeeding Booklet and Warm line information provided with discussion. Discussed typical  weight loss and the importance of pediatrician appointment within 24-48 hours of discharge, at 2 weeks of life and normalcy of requesting pediatric weight checks as needed in between visits. Pt will successfully establish breastfeeding by feeding in response to early feeding cues  
or wake every 3h, will obtain deep latch, and will keep log of feedings/output. Taught to BF at hunger cues and or q 2-3 hrs and to offer 10-20 drops of hand expressed colostrum at any non-feeds. Breast Assessment Left Breast: Large Left Nipple: Everted, Intact, Short Right Breast: Large Right Nipple: Everted, Intact, Short Breast- Feeding Assessment Attends Breast-Feeding Classes: (mother in law is an LC, lots of info shared) Breast-Feeding Experience: No 
Breast Trauma/Surgery: No 
Type/Quality: Good Lactation Consultant Visits Breast-Feedings: Good Mother/Infant Observation Mother Observation: Breast comfortable, Close hold, Lets baby end feeding, Nipple round on release, Recognizes feeding cues, Holds breast 
Infant Observation: Rhythmic suck, Relaxed after feeding, Opens mouth, Lips flanged, upper, Lips flanged, lower, Latches nipple and aereolae, Feeding cues LATCH Documentation Latch: Grasps breast, tongue down, lips flanged, rhythmic sucking Audible Swallowing: A few with stimulation Type of Nipple: Everted (after stimulation) Comfort (Breast/Nipple): Soft/non-tender Hold (Positioning): Full assist, teach one side, mother does other, staff holds LATCH Score: 8

## 2019-01-24 VITALS
SYSTOLIC BLOOD PRESSURE: 122 MMHG | HEART RATE: 82 BPM | RESPIRATION RATE: 16 BRPM | DIASTOLIC BLOOD PRESSURE: 62 MMHG | OXYGEN SATURATION: 94 % | TEMPERATURE: 97.8 F

## 2019-01-24 LAB
ABO + RH BLD: NORMAL
BLD PROD TYP BPU: NORMAL
BPU ID: NORMAL
FETAL SCREEN,FMHS: NORMAL
STATUS OF UNIT,%ST: NORMAL
UNIT DIVISION, %UDIV: 0
WEAK D AG RBC QL: NORMAL

## 2019-01-24 PROCEDURE — 74011250637 HC RX REV CODE- 250/637: Performed by: OBSTETRICS & GYNECOLOGY

## 2019-01-24 PROCEDURE — 90707 MMR VACCINE SC: CPT | Performed by: OBSTETRICS & GYNECOLOGY

## 2019-01-24 PROCEDURE — 74011250636 HC RX REV CODE- 250/636: Performed by: OBSTETRICS & GYNECOLOGY

## 2019-01-24 RX ORDER — HYDROCODONE BITARTRATE AND ACETAMINOPHEN 5; 325 MG/1; MG/1
1 TABLET ORAL
Qty: 10 TAB | Refills: 0 | Status: SHIPPED | OUTPATIENT
Start: 2019-01-24

## 2019-01-24 RX ADMIN — DOCUSATE SODIUM 100 MG: 100 CAPSULE, LIQUID FILLED ORAL at 17:31

## 2019-01-24 RX ADMIN — MEASLES, MUMPS, AND RUBELLA VIRUS VACCINE LIVE 0.5 ML: 1000; 12500; 1000 INJECTION, POWDER, LYOPHILIZED, FOR SUSPENSION SUBCUTANEOUS at 17:31

## 2019-01-24 RX ADMIN — DOCUSATE SODIUM 100 MG: 100 CAPSULE, LIQUID FILLED ORAL at 09:21

## 2019-01-24 RX ADMIN — ACETAMINOPHEN 650 MG: 325 TABLET ORAL at 03:24

## 2019-01-24 RX ADMIN — IBUPROFEN 800 MG: 800 TABLET ORAL at 17:31

## 2019-01-24 RX ADMIN — IBUPROFEN 800 MG: 800 TABLET ORAL at 09:21

## 2019-01-24 NOTE — LACTATION NOTE
This note was copied from a baby's chart. Went in for visit with mother, mother states that baby just fed and went back under lights. Mother states she started supplementing last night per NP order because of high bili. Discussed with mother the addition of pumping today, mother agreed, will call for LC at next feeding.

## 2019-01-24 NOTE — PROGRESS NOTES
2220 Blood glucose was 131 at 2220 before patient went to sleep for the night. Patient stated, \"But I also have 3 units on board. \" 
 
5743 Blood glucose was 139 at 0550 prior to breakfast. Patient stated, \"No units on board. \" 
 
0725 Bedside and Verbal shift change report given to ISIDRO Samayoa RN (oncoming nurse) by Jennifer Vee. Lukas Carver RN (offgoing nurse). Report included the following information SBAR, Kardex, Intake/Output and MAR.

## 2019-01-24 NOTE — PROGRESS NOTES
1800: Patient discharged. Patient will be boarding overnight in her hospital room as infant's discharge is held for hyperbilirubinemia. RN gave patient postpartum education, patient verbalized understanding. Discharge instructions signed electronically and patient given a paper copy. Patient taking a shower and using her sitz bath with education provided by RN. Patient's  has gotten prescriptions filled for patient overnight. Prescriptions:  
Norco 
*Recommended over the counter Motrin and Colace.

## 2019-01-24 NOTE — LACTATION NOTE
This note was copied from a baby's chart. Discussed with mother her plan for feeding. Reviewed the benefits of exclusive breast milk feeding during the hospital stay. Informed her of the risks of using formula to supplement in the first few days of life as well as the benefits of successful breast milk feeding; referred her to the Breastfeeding booklet about this information. She acknowledges understanding of information reviewed and states that it is her plan to breastfeed her infant. Will support her choice and offer additional information as needed. Pt will successfully establish breastfeeding by feeding in response to early feeding cues  
or wake every 3h, will obtain deep latch, and will keep log of feedings/output. Taught to BF at hunger cues and or q 2-3 hrs and to offer 10-20 drops of hand expressed colostrum at any non-feeds. Breast Assessment Left Breast: Large Left Nipple: Everted, Intact Right Breast: Large Right Nipple: Everted, Intact Breast- Feeding Assessment Attends Breast-Feeding Classes: No 
Breast-Feeding Experience: No 
Breast Trauma/Surgery: Yes(nipple piercing) Type/Quality: Good Lactation Consultant Visits Breast-Feedings: Good Mother/Infant Observation Mother Observation: Alignment, Breast comfortable, Close hold Infant Observation: Latches nipple and aereolae, Lips flanged, lower, Lips flanged, upper, Opens mouth LATCH Documentation Latch: Grasps breast, tongue down, lips flanged, rhythmic sucking Audible Swallowing: A few with stimulation Type of Nipple: Everted (after stimulation) Comfort (Breast/Nipple): Soft/non-tender Hold (Positioning): Full assist, teach one side, mother does other, staff holds LATCH Score: 8 Reviewed breastfeeding basics:  How milk is made and normal  breastfeeding behaviors discussed.   Supply and demand,  stomach size, early feeding cues, skin to skin bonding with comfortable positioning and baby led latch-on reviewed. How to identify signs of successful breastfeeding sessions reviewed; education on assymetrical latch, signs of effective latching vs shallow, in-effective latching, normal  feeding frequency and duration and expected infant output discussed. Normal course of breastfeeding discussed including the AAP's recommendation that children receive exclusive breast milk feedings for the first six months of life with breast milk feedings to continue through the first year of life and/or beyond as complimentary table foods are added. Breastfeeding Booklet and Warm line information provided with discussion. Discussed typical  weight loss and the importance of pediatrician appointment within 24-48 hours of discharge, at 2 weeks of life and normalcy of requesting pediatric weight checks as needed in between visits. Baby went under bili lights this am for elevated bili level. NP ordered supplement, baby given formula times 2 this am. Discussed adding pumping and offering EBM instead of formula, parents like that plan and do not want to give formula unless they have to. Baby fed well at breast on both sides during [facility] visit Biological Nurturing breastfeeding principles taught. How Biological Nurturing (BN) 
promotes optimal breastfeeding (BF) sessions discussed. Mother encouraged to seek comfortable semi-reclining breastfeeding positions. Infant placed frontally along maternal contour. Primitive innate feeding reflexes/behaviors of the  discussed. BN tips and techniques shared; assisted with comfortable breastfeeding positioning. Guidelines for pumping, milk collection and storage, proper cleaning of pump parts all reviewed. Differences between hospital grade rental pumps vs store bought double electric/hand pumps discussed. Set up pumping with double electric set up. Assisted with pump session.   List of area pump rental locations and lactation support services reviewed. Assisted mother with pumping session, mother able to pump 15ml of EBM, given to baby.

## 2019-01-24 NOTE — PROGRESS NOTES
Post-Partum Day Number 2 Progress/Discharge Note Patient doing well post-partum. Has \"ins and needles\" sensation inner right leg, no weakness, ambulating without difficulty. She is voiding without difficulty, she reports normal lochia. She is ambulatiing without dizziness. Her pain is well controlled with oral pain medication - just taking motrin and tylenol. She is tolerating general diet. Baby latching well, milk has not come in. Baby under bili lights, mom to board. Managing DM with insulin pump per Dr. Jennifer Carlos. Vitals:   
Patient Vitals for the past 8 hrs: 
 BP Temp Pulse Resp  
19 0449 104/69 98 °F (36.7 °C) (!) 101 14 Temp (24hrs), Av.2 °F (36.8 °C), Min:98 °F (36.7 °C), Max:98.3 °F (36.8 °C) Exam:  Patient without distress. Lungs:  CTA bilaterally CV: RRR with no rubs or gallops; no murmur Abdomen soft, fundus firm at level of umbilicus/U-1, nontender Lower extremities are negative for cords or tenderness; tr swelling. Labs: No results found for this or any previous visit (from the past 24 hour(s)). Lab Results Component Value Date/Time  
 Rubella, External Equivocal 2018 GrBStrep, External Negative  01/10/2019 HBsAg, External Negative  2018 HIV, External Negative  2018 Gonorrhea, External Negative  2018 Chlamydia, External Negative  2018 Assessment and Plan:    Postpartum Day #2, S/P . Doing well. - d/c home - F/U 6wk postpartum check, sooner prn - Norco 5/325 #10 
 - sitz bath 
 - anticipate paresthesia will resolve spontaneously. Advised to call office if does not improve or if develops any additional sx. 
 - cont DM management per Dr. Jennifer gomez

## 2019-01-24 NOTE — PROGRESS NOTES
Blood glucose at 1830 was 104 before dinner. Carbs counted for dinner 45. Blood sugar at [facility] is 118.  8 units delivered per pump.

## 2019-01-24 NOTE — PROGRESS NOTES
Bedside shift change report given to [address] (oncoming nurse) by [employer] (offgoing nurse). Report included the following information SBAR, Kardex, Intake/Output, MAR and Recent Results.

## 2019-01-24 NOTE — DISCHARGE INSTRUCTIONS
POST DELIVERY DISCHARGE INSTRUCTIONS    Name: Yogesh Gonzalez  YOB: 1990      General:     Diet/Diet Restrictions:  Eight 8-ounce glasses of fluid daily (primarily water); avoid excessive caffeine intake. Meals/snacks as desired which are high in fiber and complex carbohydrates and low in fat and cholesterol. Physical Activity / Restrictions / Safety:     Avoid heavy lifting, no more that 15 lbs. For 2-3 weeks; No driving while taking narcotic pain medication. Post  patients should not drive until pain free. No intercourse until seen for your 6 week postpartum visit, no douching or tampon use. No swimming or tub baths for 6 weeks. May resume exercise in 4-6 weeks. Discharge Instructions/Special Treatment/Home Care Needs:     Continue prenatal vitamins. Continue to use squirt bottle with warm water on your episiotomy after each bathroom use until bleeding stops. If steri-strips applied to your incision, remove in 14 days. Take stool softeners daily. Call your doctor for the following:     Fever over 101 degrees by mouth. Vaginal bleeding heavier than a normal menstrual period or lost larger than a golf ball. Red streaks or increased swelling of legs, painful red streaks on your breast.  Painful urination, or increased pain, redness or discharge with your incision. Pain Management:     Pain Management:   Take Acetaminophen (Tylenol) or Ibuprofen (Advil, Motrin), as directed for pain. Use a warm Sitz bath 3 times daily to relieve episiotomy or hemorrhoidal discomfort. Heating pad to  incision as needed. For hemorrhoidal discomfort, use Tucks and Anusol cream as needed and directed.     Follow-Up Care:     Appointment with MD:   Follow-up Appointments   Procedures    FOLLOW UP VISIT Appointment in: 6 Weeks     Standing Status:   Standing     Number of Occurrences:   1     Order Specific Question:   Appointment in     Answer:   6 Weeks     Telephone number: [phone]    Signed By: Reva Lakhani MD                                                                                                   Date: 1/24/2019 Time: 9:07 AM

## 2019-01-24 NOTE — DISCHARGE SUMMARY
Obstetrical Discharge Summary     Name: Flavia Vaz MRN: [id number]  SSN: [id number]    YOB: 1990  Age: 33 y.o. Sex: female      Admit Date: 2019    Discharge Date: No discharge date for patient encounter. Admitting Physician: Kelly Merritt MD     Attending Physician:  eKndra Davis MD     Admission Diagnoses: Pregnancy;Pregnant and not yet delivered;Preeclampsia    Procedures for this admission:     Discharge Diagnoses:   Information for the patient's : Irma Donnelly, Female Ivette [[id number]]   Delivery of a 6 lb 15.3 oz (3.155 kg) female infant via Vaginal, Spontaneous on 2019 at 10:27 PM  by . Apgars were 9 and 9. Discharge Condition: good    Discharge disposition: Home Narcotic pain medication    Hospital Course: Normal hospital course following the delivery. Patient Instructions:   Current Discharge Medication List      START taking these medications    Details   HYDROcodone-acetaminophen (NORCO) 5-325 mg per tablet Take 1 Tab by mouth every four (4) hours as needed for Pain. Max Daily Amount: 6 Tabs. Qty: 10 Tab, Refills: 0    Associated Diagnoses: Postpartum pain         CONTINUE these medications which have NOT CHANGED    Details   multivitamin (ONE A DAY) tablet Take 1 Tab by mouth daily. calcium carb/magnesium hydrox (ROLAIDS PO) Take  by mouth. PNV38-Iron Cbn&Gluc-FA-DSS-DHA 35-1- mg cmpk Take  by mouth. B.infantis-B.ani-B.long-B.bifi (PROBIOTIC 4X) 10-15 mg TbEC Take  by mouth. !! INSULIN LISPRO (HUMALOG SC) 2 Units/hr by SubCUTAneous route daily. Continuous basal rate       !! HUMALOG U-100 INSULIN 100 unit/mL injection     Associated Diagnoses: Encounter for supervision of normal first pregnancy, second trimester      insulin glargine (LANTUS) 100 unit/mL injection by SubCUTAneous route nightly. insulin aspart (NOVOLOG) 100 unit/mL injection 15 Units by SubCUTAneous route as needed.        !! - Potential duplicate medications found. Please discuss with provider. STOP taking these medications       aspirin delayed-release 81 mg tablet Comments:   Reason for Stopping:         ondansetron (ZOFRAN ODT) 4 mg disintegrating tablet Comments:   Reason for Stopping:         OTHER Comments:   Reason for Stopping:               Reference my discharge instructions.     Follow-up Appointments   Procedures    FOLLOW UP VISIT Appointment in: 6 Weeks     Standing Status:   Standing     Number of Occurrences:   1     Order Specific Question:   Appointment in     Answer:   6 Weeks        Signed By:  Alyssa Ramirez MD     January 24, 2019

## 2019-03-08 ENCOUNTER — OFFICE VISIT (OUTPATIENT)
Dept: OBGYN CLINIC | Age: 29
End: 2019-03-08

## 2019-03-08 VITALS
HEART RATE: 83 BPM | DIASTOLIC BLOOD PRESSURE: 80 MMHG | SYSTOLIC BLOOD PRESSURE: 125 MMHG | BODY MASS INDEX: 26.66 KG/M2 | HEIGHT: 65 IN | WEIGHT: 160 LBS

## 2019-03-08 DIAGNOSIS — M25.531 RIGHT WRIST PAIN: ICD-10-CM

## 2019-03-08 DIAGNOSIS — L92.9 GRANULATION TISSUE AT OBSTETRICAL LACERATION SITE: ICD-10-CM

## 2019-03-08 NOTE — PATIENT INSTRUCTIONS
Postpartum: Care Instructions  Your Care Instructions  After childbirth (postpartum period), your body goes through many changes. Some of these changes happen over several weeks. In the hours after delivery, your body will begin to recover from childbirth while it prepares to breastfeed your . You may feel emotional during this time. Your hormones can shift your mood without warning for no clear reason. In the first couple of weeks after childbirth, many women have emotions that change from happy to sad. You may find it hard to sleep. You may cry a lot. This is called the \"baby blues. \" These overwhelming emotions often go away within a couple of days or weeks. But it's important to discuss your feelings with your doctor. It is easy to get too tired and overwhelmed during the first weeks after childbirth. Don't try to do too much. Get rest whenever you can, accept help from others, and eat well and drink plenty of fluids. About 4 to 6 weeks after your baby's birth, you will have a follow-up visit with your doctor. This visit is your time to talk to your doctor about anything you are concerned or curious about. Follow-up care is a key part of your treatment and safety. Be sure to make and go to all appointments, and call your doctor if you are having problems. It's also a good idea to know your test results and keep a list of the medicines you take. How can you care for yourself at home? · Sleep or rest when your baby sleeps. · Get help with household chores from family or friends, if you can. Do not try to do it all yourself. · If you have hemorrhoids or swelling or pain around the opening of your vagina, try using cold and heat. You can put ice or a cold pack on the area for 10 to 20 minutes at a time. Put a thin cloth between the ice and your skin. Also try sitting in a few inches of warm water (sitz bath) 3 times a day and after bowel movements. · Take pain medicines exactly as directed. ?  If the doctor gave you a prescription medicine for pain, take it as prescribed. ? If you are not taking a prescription pain medicine, ask your doctor if you can take an over-the-counter medicine. · Eat more fiber to avoid constipation. Include foods such as whole-grain breads and cereals, raw vegetables, raw and dried fruits, and beans. · Drink plenty of fluids, enough so that your urine is light yellow or clear like water. If you have kidney, heart, or liver disease and have to limit fluids, talk with your doctor before you increase the amount of fluids you drink. · Do not rinse inside your vagina with fluids (douche). · If you have stitches, keep the area clean by pouring or spraying warm water over the area outside your vagina and anus after you use the toilet. · Keep a list of questions to bring to your postpartum visit. Your questions might be about:  ? Changes in your breasts, such as lumps or soreness. ? When to expect your menstrual period to start again. ? What form of birth control is best for you. ? Weight you have put on during the pregnancy. ? Exercise options. ? What foods and drinks are best for you, especially if you are breastfeeding. ? Problems you might be having with breastfeeding. ? When you can have sex. Some women may want to talk about lubricants for the vagina. ? Any feelings of sadness or restlessness that you are having. When should you call for help? Call 911 anytime you think you may need emergency care.  For example, call if:    · You have thoughts of harming yourself, your baby, or another person.     · You passed out (lost consciousness).    Call your doctor now or seek immediate medical care if:    · Your vaginal bleeding seems to be getting heavier.     · You are dizzy or lightheaded, or you feel like you may faint.     · You have a fever.    Watch closely for changes in your health, and be sure to contact your doctor if:    · You have new or worse vaginal discharge.     · You feel sad or depressed.     · You are having problems with your breasts or breastfeeding. Where can you learn more? Go to http://anup-pauline.info/. Enter J222 in the search box to learn more about \"Postpartum: Care Instructions. \"  Current as of: September 5, 2018  Content Version: 11.9  © 3510-5956 [employer]. Care instructions adapted under license by [employer] (which disclaims liability or warranty for this information). If you have questions about a medical condition or this instruction, always ask your healthcare professional. [place] [address] any warranty or liability for your use of this information.

## 2019-03-08 NOTE — PROGRESS NOTES
Postpartum evaluation    Jomar Cummings is a 33 y.o. female who presents for a postpartum exam.     She is now six weeks post normal spontaneous vaginal delivery on 1/22/19, baby girl: Had partial 3rd degree, seems to be healing OK. Her baby is doing well. She has had no menses since delivery. She has had the following significant problems since her delivery: possible postpartum depression, questionnaire given to pt. EPDS=8  Problems with right wrist, carpal tunnel. Still having significant pain, difficulty carrying baby or lifting diaper bag. Sugars fluctuating somewhat, followed by endo. The patient is breastfeeding without difficulty. The patient would not like to use birth control.      She is currently taking: Insulin and PNVs.     She is due for her next AE in June 2019     Visit Vitals  Ht 5' 5\" (1.651 m)   BMI 30.62 kg/m²       PHYSICAL EXAMINATION    Constitutional  · Appearance: well-nourished, well developed, alert, in no acute distress    HENT  · Head and Face: appears normal    Neck  · Inspection/Palpation: normal appearance, no masses or tenderness  · Lymph Nodes: no lymphadenopathy present  · Thyroid: gland size normal, nontender, no nodules or masses present on palpation    Chest  · Respiratory Effort: breathing unlabored  · Auscultation: normal breath sounds    Cardiovascular  · Heart:  · Auscultation: regular rate and rhythm without murmur      Gastrointestinal  · Abdominal Examination: abdomen non-tender to palpation, normal bowel sounds, no masses present  · Liver and spleen: no hepatomegaly present, spleen not palpable  · Hernias: no hernias identified    Genitourinary  · External Genitalia: normal appearance for age, no discharge present, no tenderness present, no inflammatory lesions present, no masses present, no atrophy present  · Vagina: small area (2-3mm) granulation tissue right vaginal side wall, otherwise normal vaginal vault without central or paravaginal defects, no discharge present, no inflammatory lesions present, no masses present; stitches healing well  · Bladder: non-tender to palpation  · Urethra: appears normal  · Cervix: normal   · Uterus: normal size, shape and consistency  · Adnexa: no adnexal tenderness present, no adnexal masses present  · Perineum: perineum within normal limits, no evidence of trauma, no rashes or skin lesions present  · Anus: anus within normal limits, no hemorrhoids present  · Inguinal Lymph Nodes: no lymphadenopathy present    Skin  · General Inspection: no rash, no lesions identified    Neurologic/Psychiatric  · Mental Status:  · Orientation: grossly oriented to person, place and time  · Mood and Affect: mood normal, affect appropriate    Assessment:  Normal postpartum check  Granulation tissue along vaginal side wall  Right wrist pain  Sugars fluctating    Plan:  RTO for AE 7/2019 with pap  RTO 3-4wks to recheck granulation tissue.   If still present, consider silver nitrate  rec ortho for wrist pain  F/u with endo for DM

## 2019-04-08 ENCOUNTER — OFFICE VISIT (OUTPATIENT)
Dept: OBGYN CLINIC | Age: 29
End: 2019-04-08

## 2019-04-08 VITALS
DIASTOLIC BLOOD PRESSURE: 73 MMHG | BODY MASS INDEX: 26.89 KG/M2 | WEIGHT: 161.4 LBS | HEIGHT: 65 IN | SYSTOLIC BLOOD PRESSURE: 122 MMHG

## 2019-04-08 DIAGNOSIS — N93.9 VAGINA BLEEDING: Primary | ICD-10-CM

## 2019-04-08 PROBLEM — Z34.90 PREGNANCY: Status: RESOLVED | Noted: 2018-07-02 | Resolved: 2019-04-08

## 2019-04-08 NOTE — PROGRESS NOTES
Abnormal bleeding note      Connie Faye is a 33 y.o. female who complains of vaginal bleeding problems. Her current method of family planning is none. Currently breastfeeding, delivered vaginally 1/22/19. She is unsure if she started her period or if she over did it this past weekend. She was seen on 3/8/19 for routine post partum and was advised she did still have some granulation tissue. Mowed grass on Sat with riding . Bleeding started Sat night. Bright red. Has not been sexually active since delivery. No cramping. No clots. She developed this problem approximately 2 days ago. She has had vaginal bleeding which she describes as moderate to heavy lasting up to a few days. Pad or tampon count: 1 a day -- took all day to fill a large pad. Associated symptoms include none. Alleviating factors: none  Aggravating factors: none      The patient has not been sexually active since delivery.     Last Pap smear:was normal.    Her relevant past medical history:   Past Medical History:   Diagnosis Date    Diabetes mellitus ([facility] [address]     Type I    Encounter for insertion of mirena IUD 06/07/2012    Encounter for IUD removal 07/19/2012    due to chronic cramping    Gestational hypertension     preeclampsia    Goiter 2009    nodule, no treatment needed    Pap smear for cervical cancer screening 7/5/16    Negative (no hpv)         Past Surgical History:   Procedure Laterality Date    HX LIPOSUCTION  2013    HX ORTHOPAEDIC      foot surgery - jessica wart on bottom of foot     Social History     Occupational History    Not on file   Tobacco Use    Smoking status: Never Smoker    Smokeless tobacco: Never Used    Tobacco comment: Never used vapor or e-cigs   Substance and Sexual Activity    Alcohol use: No    Drug use: No    Sexual activity: Not Currently     Partners: Male     Birth control/protection: None     Family History   Problem Relation Age of Onset    Diabetes Paternal Grandmother         Type I    Hypertension Maternal Grandmother     Hypertension Mother        No Known Allergies  Prior to Admission medications    Medication Sig Start Date End Date Taking? Authorizing Provider   HUMALOG U-100 INSULIN 100 unit/mL injection  8/29/18  Yes Provider, Historical   PNV38-Iron Cbn&Gluc-FA-DSS-DHA 35-1- mg cmpk Take  by mouth. Yes Provider, Historical   INSULIN LISPRO (HUMALOG SC) 2 Units/hr by SubCUTAneous route daily. Continuous basal rate    Yes Other, MD Felisha   HYDROcodone-acetaminophen (NORCO) 5-325 mg per tablet Take 1 Tab by mouth every four (4) hours as needed for Pain. Max Daily Amount: 6 Tabs. 1/24/19   Clary Farfan MD   multivitamin (ONE A DAY) tablet Take 1 Tab by mouth daily. Provider, Historical   calcium carb/magnesium hydrox (ROLAIDS PO) Take  by mouth. Provider, Historical   B.infantis-B.ani-B.long-B.bifi (PROBIOTIC 4X) 10-15 mg TbEC Take  by mouth. Provider, Historical   insulin glargine (LANTUS) 100 unit/mL injection by SubCUTAneous route nightly. Provider, Historical   insulin aspart (NOVOLOG) 100 unit/mL injection 15 Units by SubCUTAneous route as needed.     Other, MD Felisha              Objective:    Visit Vitals  /73   Ht 5' 5\" (1.651 m)   Wt 161 lb 6.4 oz (73.2 kg)   BMI 26.86 kg/m²          PHYSICAL EXAMINATION    Constitutional  · Appearance: well-nourished, well developed, alert, in no acute distress    HENT  · Head and Face: appears normal    Gastrointestinal  · Abdominal Examination: abdomen non-tender to palpation, normal bowel sounds, no masses present  · Liver and spleen: no hepatomegaly present, spleen not palpable  · Hernias: no hernias identified    Genitourinary  · External Genitalia: normal appearance for age, no discharge present, no tenderness present, no inflammatory lesions present, no masses present, no atrophy present  · Vagina: normal vaginal vault without central or paravaginal defects, no discharge present, no inflammatory lesions present, no masses present; small amount of dark menstrual blood; still has small area 2-3mm granulation tissue right side wall, fairly flat  · Bladder: non-tender to palpation  · Urethra: appears normal  · Cervix: normal   · Uterus: normal size, shape and consistency  · Adnexa: no adnexal tenderness present, no adnexal masses present  · Perineum: perineum within normal limits, no evidence of trauma, no rashes or skin lesions present  · Anus: anus within normal limits, no hemorrhoids present  · Inguinal Lymph Nodes: no lymphadenopathy present    Skin  · General Inspection: no rash, no lesions identified    Neurologic/Psychiatric  · Mental Status:  · Orientation: grossly oriented to person, place and time  · Mood and Affect: mood normal, affect appropriate    Assessment:   Vaginal bleeding  Granulation tissue - declines silver nitrate    Plan: Will monitor  Menstrual calendar  If does not resolve, consider Provera x10d. Instructions given to pt. Handouts given to pt.

## 2019-12-04 ENCOUNTER — TELEPHONE (OUTPATIENT)
Dept: OBGYN CLINIC | Age: 29
End: 2019-12-04

## 2019-12-04 NOTE — TELEPHONE ENCOUNTER
Message left at 341PM    26year old patient last seen in the office on 3/8/19 for post partum visit. Patient left a message to say that she was transferred to speak to nurse regarding concerns about depression and whether she needs to be referred out or to be seen in the office. This nurse attempted to reach the patient and left a detailed message for the patient to seek care at emergency room if she has feelings of hurting her self or some one else.     This nurse will call the patient in the am.

## 2019-12-05 NOTE — TELEPHONE ENCOUNTER
This nurse attempted to reach the patient and left a detailed message regarding MD recommendations and that some contact information for counseling was being mailed to her address.

## 2019-12-05 NOTE — TELEPHONE ENCOUNTER
Sounds like it would be best for her to start with a counselor/therapist.  We have contact info if needed.

## 2019-12-05 NOTE — TELEPHONE ENCOUNTER
Patient is calling because she gave birth 1/22/19. Since then patient is saying that she is \"emotionless\". She does not feel happy, does not feel sad, does not feel angry, literally just feels different. She does not feel it is depression. 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 Would you like to see her for this or suggest that she see someone else? She does not feel she needs medication. She feels it may be a hormonal issue.

## 2020-02-04 ENCOUNTER — TELEPHONE (OUTPATIENT)
Dept: OBGYN CLINIC | Age: 30
End: 2020-02-04

## 2020-02-04 NOTE — TELEPHONE ENCOUNTER
Patient advised of MD recommendations and was provided with the name of a counselor. This nurse will mail a copy of the complete list to patient confirmed address. Patient verbalized understanding.

## 2020-02-04 NOTE — TELEPHONE ENCOUNTER
Call received at 315PM    26year old patient last seen in the office on 3.8.19 for post partum visit    Patient calling to say that she went to one of the providers on the list mailed to her Dr. Arely Phillips( ? Sp) and patient would not recommend her to any one. Patient reports she is seeing orthopedics for \"mommy wrist\" and is having less pain and feeling better but would like to talk with some one, since she has occasional feelings of being down. Patient is calling back to get the list of recommended providers since she has thrown the previous list away. Patient is not in danger of hurting her self or someone else    I will come get the recommendations to mail to patient    Patient address confirmed.

## 2022-03-19 PROBLEM — O14.90 PREECLAMPSIA: Status: ACTIVE | Noted: 2019-01-21
